# Patient Record
Sex: FEMALE | Race: WHITE | URBAN - METROPOLITAN AREA
[De-identification: names, ages, dates, MRNs, and addresses within clinical notes are randomized per-mention and may not be internally consistent; named-entity substitution may affect disease eponyms.]

---

## 2017-08-24 ENCOUNTER — EMERGENCY (EMERGENCY)
Facility: HOSPITAL | Age: 42
LOS: 0 days | Discharge: HOME | End: 2017-08-24
Admitting: GENERAL PRACTICE

## 2017-08-24 DIAGNOSIS — O20.8 OTHER HEMORRHAGE IN EARLY PREGNANCY: ICD-10-CM

## 2017-08-24 DIAGNOSIS — Z79.899 OTHER LONG TERM (CURRENT) DRUG THERAPY: ICD-10-CM

## 2017-08-24 DIAGNOSIS — N93.9 ABNORMAL UTERINE AND VAGINAL BLEEDING, UNSPECIFIED: ICD-10-CM

## 2017-08-24 DIAGNOSIS — K21.9 GASTRO-ESOPHAGEAL REFLUX DISEASE WITHOUT ESOPHAGITIS: ICD-10-CM

## 2017-08-24 DIAGNOSIS — Z88.0 ALLERGY STATUS TO PENICILLIN: ICD-10-CM

## 2017-08-26 ENCOUNTER — EMERGENCY (EMERGENCY)
Facility: HOSPITAL | Age: 42
LOS: 0 days | Discharge: HOME | End: 2017-08-26
Admitting: GENERAL PRACTICE

## 2017-08-26 DIAGNOSIS — R79.89 OTHER SPECIFIED ABNORMAL FINDINGS OF BLOOD CHEMISTRY: ICD-10-CM

## 2017-08-26 DIAGNOSIS — Z79.899 OTHER LONG TERM (CURRENT) DRUG THERAPY: ICD-10-CM

## 2017-08-26 DIAGNOSIS — Z88.0 ALLERGY STATUS TO PENICILLIN: ICD-10-CM

## 2017-08-26 DIAGNOSIS — O03.9 COMPLETE OR UNSPECIFIED SPONTANEOUS ABORTION WITHOUT COMPLICATION: ICD-10-CM

## 2017-08-26 DIAGNOSIS — K21.9 GASTRO-ESOPHAGEAL REFLUX DISEASE WITHOUT ESOPHAGITIS: ICD-10-CM

## 2017-09-06 ENCOUNTER — EMERGENCY (EMERGENCY)
Facility: HOSPITAL | Age: 42
LOS: 0 days | Discharge: HOME | End: 2017-09-06
Admitting: GENERAL PRACTICE

## 2017-09-06 DIAGNOSIS — Z88.0 ALLERGY STATUS TO PENICILLIN: ICD-10-CM

## 2017-09-06 DIAGNOSIS — Z79.899 OTHER LONG TERM (CURRENT) DRUG THERAPY: ICD-10-CM

## 2017-09-06 DIAGNOSIS — Z32.02 ENCOUNTER FOR PREGNANCY TEST, RESULT NEGATIVE: ICD-10-CM

## 2017-09-06 DIAGNOSIS — K21.9 GASTRO-ESOPHAGEAL REFLUX DISEASE WITHOUT ESOPHAGITIS: ICD-10-CM

## 2018-01-10 ENCOUNTER — OUTPATIENT (OUTPATIENT)
Dept: OUTPATIENT SERVICES | Facility: HOSPITAL | Age: 43
LOS: 1 days | Discharge: HOME | End: 2018-01-10

## 2018-01-29 ENCOUNTER — OUTPATIENT (OUTPATIENT)
Dept: OUTPATIENT SERVICES | Facility: HOSPITAL | Age: 43
LOS: 1 days | Discharge: HOME | End: 2018-01-29

## 2018-02-05 ENCOUNTER — OUTPATIENT (OUTPATIENT)
Dept: OUTPATIENT SERVICES | Facility: HOSPITAL | Age: 43
LOS: 1 days | Discharge: HOME | End: 2018-02-05

## 2018-03-05 PROBLEM — Z00.00 ENCOUNTER FOR PREVENTIVE HEALTH EXAMINATION: Status: ACTIVE | Noted: 2018-03-05

## 2018-03-27 ENCOUNTER — LABORATORY RESULT (OUTPATIENT)
Age: 43
End: 2018-03-27

## 2018-03-27 ENCOUNTER — OUTPATIENT (OUTPATIENT)
Dept: OUTPATIENT SERVICES | Facility: HOSPITAL | Age: 43
LOS: 1 days | Discharge: HOME | End: 2018-03-27

## 2018-03-27 ENCOUNTER — APPOINTMENT (OUTPATIENT)
Dept: UROLOGY | Facility: CLINIC | Age: 43
End: 2018-03-27
Payer: MEDICAID

## 2018-03-27 VITALS
HEIGHT: 60 IN | HEART RATE: 74 BPM | DIASTOLIC BLOOD PRESSURE: 65 MMHG | SYSTOLIC BLOOD PRESSURE: 103 MMHG | WEIGHT: 125 LBS | BODY MASS INDEX: 24.54 KG/M2

## 2018-03-27 DIAGNOSIS — Z78.9 OTHER SPECIFIED HEALTH STATUS: ICD-10-CM

## 2018-03-27 PROCEDURE — 99212 OFFICE O/P EST SF 10 MIN: CPT

## 2018-03-27 RX ORDER — CLOBETASOL PROPIONATE 0.5 MG/G
0.05 CREAM TOPICAL
Qty: 60 | Refills: 0 | Status: ACTIVE | COMMUNITY
Start: 2017-11-20

## 2018-03-27 RX ORDER — MICONAZOLE NITRATE 20 MG/G
2 CREAM VAGINAL
Qty: 45 | Refills: 0 | Status: ACTIVE | COMMUNITY
Start: 2017-11-20

## 2018-03-27 RX ORDER — CIPROFLOXACIN HYDROCHLORIDE 500 MG/1
500 TABLET, FILM COATED ORAL
Qty: 14 | Refills: 0 | Status: ACTIVE | COMMUNITY
Start: 2017-10-05

## 2018-03-27 RX ORDER — NITROFURANTOIN (MONOHYDRATE/MACROCRYSTALS) 25; 75 MG/1; MG/1
100 CAPSULE ORAL
Qty: 14 | Refills: 0 | Status: ACTIVE | COMMUNITY
Start: 2017-09-28

## 2018-03-27 RX ORDER — ACYCLOVIR 50 MG/G
5 CREAM TOPICAL
Qty: 15 | Refills: 0 | Status: ACTIVE | COMMUNITY
Start: 2017-11-20

## 2018-03-27 RX ORDER — CLOTRIMAZOLE AND BETAMETHASONE DIPROPIONATE 10; .5 MG/G; MG/G
1-0.05 CREAM TOPICAL
Qty: 45 | Refills: 0 | Status: ACTIVE | COMMUNITY
Start: 2017-11-24

## 2018-03-27 RX ORDER — PERMETHRIN 50 MG/G
5 CREAM TOPICAL
Qty: 60 | Refills: 0 | Status: ACTIVE | COMMUNITY
Start: 2017-12-14

## 2018-03-28 DIAGNOSIS — N39.0 URINARY TRACT INFECTION, SITE NOT SPECIFIED: ICD-10-CM

## 2018-03-30 ENCOUNTER — OUTPATIENT (OUTPATIENT)
Dept: OUTPATIENT SERVICES | Facility: HOSPITAL | Age: 43
LOS: 1 days | Discharge: HOME | End: 2018-03-30

## 2018-03-30 DIAGNOSIS — N20.0 CALCULUS OF KIDNEY: ICD-10-CM

## 2018-03-30 DIAGNOSIS — R10.9 UNSPECIFIED ABDOMINAL PAIN: ICD-10-CM

## 2018-03-30 DIAGNOSIS — N39.0 URINARY TRACT INFECTION, SITE NOT SPECIFIED: ICD-10-CM

## 2018-04-27 ENCOUNTER — APPOINTMENT (OUTPATIENT)
Dept: UROLOGY | Facility: CLINIC | Age: 43
End: 2018-04-27
Payer: MEDICAID

## 2018-04-27 VITALS
WEIGHT: 125 LBS | HEIGHT: 60 IN | DIASTOLIC BLOOD PRESSURE: 74 MMHG | BODY MASS INDEX: 24.54 KG/M2 | HEART RATE: 72 BPM | SYSTOLIC BLOOD PRESSURE: 116 MMHG

## 2018-04-27 DIAGNOSIS — A49.9 URINARY TRACT INFECTION, SITE NOT SPECIFIED: ICD-10-CM

## 2018-04-27 DIAGNOSIS — N39.0 URINARY TRACT INFECTION, SITE NOT SPECIFIED: ICD-10-CM

## 2018-04-27 DIAGNOSIS — N23 UNSPECIFIED RENAL COLIC: ICD-10-CM

## 2018-04-27 LAB
APPEARANCE: CLEAR
BILIRUBIN URINE: NEGATIVE
BLOOD URINE: ABNORMAL
COLOR: YELLOW
GLUCOSE QUALITATIVE U: NEGATIVE MG/DL
KETONES URINE: NEGATIVE
LEUKOCYTE ESTERASE URINE: NEGATIVE
NITRITE URINE: NEGATIVE
PH URINE: 6
PROTEIN URINE: NEGATIVE MG/DL
SPECIFIC GRAVITY URINE: 1.01
UROBILINOGEN URINE: NEGATIVE MG/DL

## 2018-04-27 PROCEDURE — 99213 OFFICE O/P EST LOW 20 MIN: CPT

## 2018-04-27 RX ORDER — SULFAMETHOXAZOLE AND TRIMETHOPRIM 400; 80 MG/1; MG/1
400-80 TABLET ORAL DAILY
Qty: 60 | Refills: 3 | Status: ACTIVE | COMMUNITY
Start: 2018-04-27 | End: 1900-01-01

## 2018-08-14 ENCOUNTER — EMERGENCY (EMERGENCY)
Facility: HOSPITAL | Age: 43
LOS: 1 days | Discharge: HOME | End: 2018-08-14

## 2018-08-14 DIAGNOSIS — Z02.9 ENCOUNTER FOR ADMINISTRATIVE EXAMINATIONS, UNSPECIFIED: ICD-10-CM

## 2018-10-15 ENCOUNTER — TRANSCRIPTION ENCOUNTER (OUTPATIENT)
Age: 43
End: 2018-10-15

## 2018-10-16 ENCOUNTER — TRANSCRIPTION ENCOUNTER (OUTPATIENT)
Age: 43
End: 2018-10-16

## 2018-10-30 ENCOUNTER — APPOINTMENT (OUTPATIENT)
Dept: UROLOGY | Facility: CLINIC | Age: 43
End: 2018-10-30

## 2018-11-02 ENCOUNTER — OUTPATIENT (OUTPATIENT)
Dept: OUTPATIENT SERVICES | Facility: HOSPITAL | Age: 43
LOS: 1 days | Discharge: HOME | End: 2018-11-02

## 2018-11-02 DIAGNOSIS — Z12.31 ENCOUNTER FOR SCREENING MAMMOGRAM FOR MALIGNANT NEOPLASM OF BREAST: ICD-10-CM

## 2019-02-07 ENCOUNTER — OUTPATIENT (OUTPATIENT)
Dept: OUTPATIENT SERVICES | Facility: HOSPITAL | Age: 44
LOS: 1 days | Discharge: HOME | End: 2019-02-07

## 2019-03-01 ENCOUNTER — OUTPATIENT (OUTPATIENT)
Dept: OUTPATIENT SERVICES | Facility: HOSPITAL | Age: 44
LOS: 1 days | End: 2019-03-01
Payer: MEDICAID

## 2019-03-01 PROCEDURE — G9001: CPT

## 2019-03-22 DIAGNOSIS — Z71.89 OTHER SPECIFIED COUNSELING: ICD-10-CM

## 2019-03-27 ENCOUNTER — APPOINTMENT (OUTPATIENT)
Dept: HEMATOLOGY ONCOLOGY | Facility: CLINIC | Age: 44
End: 2019-03-27

## 2019-08-04 ENCOUNTER — TRANSCRIPTION ENCOUNTER (OUTPATIENT)
Age: 44
End: 2019-08-04

## 2019-08-06 ENCOUNTER — TRANSCRIPTION ENCOUNTER (OUTPATIENT)
Age: 44
End: 2019-08-06

## 2019-08-07 ENCOUNTER — INPATIENT (INPATIENT)
Facility: HOSPITAL | Age: 44
LOS: 3 days | Discharge: HOME HEALTH | End: 2019-08-11
Attending: HOSPITALIST | Admitting: HOSPITALIST
Payer: MEDICAID

## 2019-08-07 ENCOUNTER — TRANSCRIPTION ENCOUNTER (OUTPATIENT)
Age: 44
End: 2019-08-07

## 2019-08-07 VITALS
HEART RATE: 81 BPM | SYSTOLIC BLOOD PRESSURE: 111 MMHG | DIASTOLIC BLOOD PRESSURE: 57 MMHG | RESPIRATION RATE: 16 BRPM | OXYGEN SATURATION: 99 % | TEMPERATURE: 97 F

## 2019-08-07 LAB
ALBUMIN SERPL ELPH-MCNC: 3.9 G/DL — SIGNIFICANT CHANGE UP (ref 3.5–5.2)
ALP SERPL-CCNC: 77 U/L — SIGNIFICANT CHANGE UP (ref 30–115)
ALT FLD-CCNC: 36 U/L — SIGNIFICANT CHANGE UP (ref 0–41)
ANION GAP SERPL CALC-SCNC: 11 MMOL/L — SIGNIFICANT CHANGE UP (ref 7–14)
AST SERPL-CCNC: 29 U/L — SIGNIFICANT CHANGE UP (ref 0–41)
BASOPHILS # BLD AUTO: 0.04 K/UL — SIGNIFICANT CHANGE UP (ref 0–0.2)
BASOPHILS NFR BLD AUTO: 0.3 % — SIGNIFICANT CHANGE UP (ref 0–1)
BILIRUB SERPL-MCNC: 0.3 MG/DL — SIGNIFICANT CHANGE UP (ref 0.2–1.2)
BUN SERPL-MCNC: 8 MG/DL — LOW (ref 10–20)
CALCIUM SERPL-MCNC: 8.9 MG/DL — SIGNIFICANT CHANGE UP (ref 8.5–10.1)
CHLORIDE SERPL-SCNC: 105 MMOL/L — SIGNIFICANT CHANGE UP (ref 98–110)
CO2 SERPL-SCNC: 22 MMOL/L — SIGNIFICANT CHANGE UP (ref 17–32)
CREAT SERPL-MCNC: 0.7 MG/DL — SIGNIFICANT CHANGE UP (ref 0.7–1.5)
EOSINOPHIL # BLD AUTO: 0.34 K/UL — SIGNIFICANT CHANGE UP (ref 0–0.7)
EOSINOPHIL NFR BLD AUTO: 2.5 % — SIGNIFICANT CHANGE UP (ref 0–8)
GLUCOSE SERPL-MCNC: 100 MG/DL — HIGH (ref 70–99)
HCT VFR BLD CALC: 32.6 % — LOW (ref 37–47)
HGB BLD-MCNC: 10.4 G/DL — LOW (ref 12–16)
IMM GRANULOCYTES NFR BLD AUTO: 0.6 % — HIGH (ref 0.1–0.3)
LACTATE SERPL-SCNC: 1 MMOL/L — SIGNIFICANT CHANGE UP (ref 0.5–2.2)
LYMPHOCYTES # BLD AUTO: 1.08 K/UL — LOW (ref 1.2–3.4)
LYMPHOCYTES # BLD AUTO: 8 % — LOW (ref 20.5–51.1)
MCHC RBC-ENTMCNC: 28.3 PG — SIGNIFICANT CHANGE UP (ref 27–31)
MCHC RBC-ENTMCNC: 31.9 G/DL — LOW (ref 32–37)
MCV RBC AUTO: 88.6 FL — SIGNIFICANT CHANGE UP (ref 81–99)
MONOCYTES # BLD AUTO: 0.78 K/UL — HIGH (ref 0.1–0.6)
MONOCYTES NFR BLD AUTO: 5.8 % — SIGNIFICANT CHANGE UP (ref 1.7–9.3)
NEUTROPHILS # BLD AUTO: 11.1 K/UL — HIGH (ref 1.4–6.5)
NEUTROPHILS NFR BLD AUTO: 82.8 % — HIGH (ref 42.2–75.2)
NRBC # BLD: 0 /100 WBCS — SIGNIFICANT CHANGE UP (ref 0–0)
PLATELET # BLD AUTO: 342 K/UL — SIGNIFICANT CHANGE UP (ref 130–400)
POTASSIUM SERPL-MCNC: 3.9 MMOL/L — SIGNIFICANT CHANGE UP (ref 3.5–5)
POTASSIUM SERPL-SCNC: 3.9 MMOL/L — SIGNIFICANT CHANGE UP (ref 3.5–5)
PROT SERPL-MCNC: 6.8 G/DL — SIGNIFICANT CHANGE UP (ref 6–8)
RBC # BLD: 3.68 M/UL — LOW (ref 4.2–5.4)
RBC # FLD: 13.2 % — SIGNIFICANT CHANGE UP (ref 11.5–14.5)
SODIUM SERPL-SCNC: 138 MMOL/L — SIGNIFICANT CHANGE UP (ref 135–146)
WBC # BLD: 13.42 K/UL — HIGH (ref 4.8–10.8)
WBC # FLD AUTO: 13.42 K/UL — HIGH (ref 4.8–10.8)

## 2019-08-07 PROCEDURE — 99285 EMERGENCY DEPT VISIT HI MDM: CPT

## 2019-08-07 RX ORDER — SODIUM CHLORIDE 9 MG/ML
1000 INJECTION INTRAMUSCULAR; INTRAVENOUS; SUBCUTANEOUS ONCE
Refills: 0 | Status: COMPLETED | OUTPATIENT
Start: 2019-08-07 | End: 2019-08-07

## 2019-08-07 RX ORDER — VANCOMYCIN HCL 1 G
1000 VIAL (EA) INTRAVENOUS ONCE
Refills: 0 | Status: COMPLETED | OUTPATIENT
Start: 2019-08-07 | End: 2019-08-07

## 2019-08-07 RX ORDER — KETOROLAC TROMETHAMINE 30 MG/ML
15 SYRINGE (ML) INJECTION ONCE
Refills: 0 | Status: DISCONTINUED | OUTPATIENT
Start: 2019-08-07 | End: 2019-08-07

## 2019-08-07 RX ORDER — ENOXAPARIN SODIUM 100 MG/ML
40 INJECTION SUBCUTANEOUS DAILY
Refills: 0 | Status: DISCONTINUED | OUTPATIENT
Start: 2019-08-07 | End: 2019-08-09

## 2019-08-07 RX ORDER — VANCOMYCIN HCL 1 G
1000 VIAL (EA) INTRAVENOUS EVERY 12 HOURS
Refills: 0 | Status: DISCONTINUED | OUTPATIENT
Start: 2019-08-07 | End: 2019-08-09

## 2019-08-07 RX ORDER — SODIUM CHLORIDE 9 MG/ML
1000 INJECTION, SOLUTION INTRAVENOUS ONCE
Refills: 0 | Status: DISCONTINUED | OUTPATIENT
Start: 2019-08-07 | End: 2019-08-07

## 2019-08-07 RX ORDER — PANTOPRAZOLE SODIUM 20 MG/1
40 TABLET, DELAYED RELEASE ORAL
Refills: 0 | Status: DISCONTINUED | OUTPATIENT
Start: 2019-08-07 | End: 2019-08-09

## 2019-08-07 RX ORDER — ACETAMINOPHEN 500 MG
975 TABLET ORAL ONCE
Refills: 0 | Status: COMPLETED | OUTPATIENT
Start: 2019-08-07 | End: 2019-08-07

## 2019-08-07 RX ADMIN — Medication 250 MILLIGRAM(S): at 17:20

## 2019-08-07 RX ADMIN — SODIUM CHLORIDE 1000 MILLILITER(S): 9 INJECTION INTRAMUSCULAR; INTRAVENOUS; SUBCUTANEOUS at 17:19

## 2019-08-07 RX ADMIN — Medication 15 MILLIGRAM(S): at 18:59

## 2019-08-07 NOTE — ED PROVIDER NOTE - NS HIV RISK FACTOR YES
[FreeTextEntry1] : In summary, this is a 72-year-old postmenopausal  lady with stage IA invasive ductal carcinoma of the left breast. T1a, N0, M0. ER positive, [place] positive, HER-2/nimco negative. She is status post lumpectomy and sentinel axillary lymph node excision.  Patient has a good performance status and is generally very healthy. She completed RT and started Letrozole July 2016.\par \par - Breast ca: LIZETTE. Tolerating Letrozole very well without significant side effects. Reports good compliance. Continue AI for total of 5-10 years. Breast imaging per Dr Reddy.\par - Osteopenia: concern for worsening bone density due to anastrozole. Rec to  continue calcium and vit D. DEXA 1-2 yrs. \par - High cholesterol: concern for worsening cholesterol due to anastrozole. Pt is on zocor. Lipid profile annually.\par - Continue to followup with primary care and Gyn\par \par RTC 6 m. 
Declined

## 2019-08-07 NOTE — H&P ADULT - ASSESSMENT
44 yo F with PMH of anxiety, GERD presents to the ED c/o abscess to right axilla that has been getting progressively larger in size and more painful x 1 week.    Right axillary cellulitis  -Already treated with Bactrim and clindamycin oral  -s/p 2 IND  -Redness and tenderness 4*4cm area in the right axilla persists.  -Will treat with IV Vancomycin.  -General Surgery consult for possible drainage though no fluctuation.(Resisting pressure)  -Would like General Anaesthesia if I and D required.    GERD  -Pantoprazole    Anxiety  -According to patient is stable and not on any medications.    Diet - Regular  DVT - Lovenox  GI - PPI  FULL CODE 44 yo F with PMH of anxiety, GERD presents to the ED c/o abscess to right axilla that has been getting progressively larger in size and more painful x 1 week.    Right axillary cellulitis  -Already treated with Bactrim and clindamycin oral  -s/p 2 IND  -Redness and tenderness 4*4cm area in the right axilla persists.  -Will treat with IV Vancomycin.  -General Surgery consult for possible drainage though no fluctuation.(Resisting pressure)    GERD  -Pantoprazole    Anxiety  -According to patient is stable and not on any medications.    Diet - Regular  DVT - Lovenox  GI - PPI  FULL CODE    #Progress Note Handoff  Pending (specify): possible I&D  Family discussion: N/A d/w the patient .   Disposition: Home

## 2019-08-07 NOTE — ED ADULT NURSE NOTE - NSIMPLEMENTINTERV_GEN_ALL_ED
Implemented All Universal Safety Interventions:  [place] to call system. Call bell, personal items and telephone within reach. Instruct patient to call for assistance. Room bathroom lighting operational. Non-slip footwear when patient is off stretcher. Physically safe environment: no spills, clutter or unnecessary equipment. Stretcher in lowest position, wheels locked, appropriate side rails in place.

## 2019-08-07 NOTE — ED PROVIDER NOTE - OBJECTIVE STATEMENT
42 yo female with PMH of anxiety, GERD presents to the ED c/o abscess to right axilla that has been getting progressively larger in size and more painful x 1 week.  Patient seen at  on 8/4 and had I&D with minimal drainage and started on clinda. 44 yo female with PMH of anxiety, GERD presents to the ED c/o abscess to right axilla that has been getting progressively larger in size and more painful x 1 week.  Patient seen at  on 8/4 and had I&D with minimal drainage and started on clinda. Patient seen again on 8/6 and had another I&D performed with more purulent drainage this time and was given Bactrim as well.  Patient denies fever, chills, chest pain, SOB, N/V/D, cough, or rash.

## 2019-08-07 NOTE — ED PROVIDER NOTE - CLINICAL SUMMARY MEDICAL DECISION MAKING FREE TEXT BOX
pt with axillary abscess, offered extending her i&d, adamantly refused, demanding iv abx and general anesthesia.  pt admitted to [facility] for iv abx and routine surg consult

## 2019-08-07 NOTE — H&P ADULT - NSHPPHYSICALEXAM_GEN_ALL_CORE
Constitutional: well-nourished, appears stated age  Eyes: PERRLA, and symmetrical lids.  ENT: Neck supple with no adenopathy, moist MM.  Cardiovascular: regular rate, regular rhythm, well-perfused extremities  Respiratory: labored respiratory effort with frequent cough, B/L diffuse significant wheezing, gets SOB if speaks longer sentences  Gastrointestinal: soft, non-tender abdomen, no pulsatile mass  Musculoskeletal: supple neck, no lower extremity edema  Integumentary: Right axillary 4*4 cm area of redness, tender, no fluctuation (Resisting any pressure)  Neurologic: awake, alert, extremities’ motor and sensory functions grossly intact

## 2019-08-07 NOTE — H&P ADULT - HISTORY OF PRESENT ILLNESS
44 yo F with PMH of anxiety, GERD presents to the ED c/o abscess to right axilla that has been getting progressively larger in size and more painful x 1 week.  Patient seen at  on 8/4 and had I&D with minimal drainage and started on clindamycin.  The size did not get smaller and according to the patient she has a history of MRSA infection( Does not remember where and when ). She went to the ED 2 days later and mentioned about MRSA, so antibiotics were changed to [id number]. It did not help and redness and swelling persisted and she presented to the ED.    In the ED, she received one dose of IV vancomycin. On my examination she is resting comfortably, and mentions that if at all drainage and I and D is required she would like to go under general anesthesia and would not like local anesthesia

## 2019-08-07 NOTE — ED PROVIDER NOTE - PROGRESS NOTE DETAILS
Patient refusing I &D of abscess to right axilla.  Patient had two I&D performed previously at an  and requesting IV antibiotics stating that PO abx have not been helping.

## 2019-08-07 NOTE — ED PROVIDER NOTE - PHYSICAL EXAMINATION
GENERAL: Well-nourished, Well-developed. NAD.  ENMT: MMM  Neck: FROM  CVS:  Normal S1,S2. No murmurs appreciated on auscultation   RESP: No use of accessory muscles. Chest rise symmetrical with good expansion. Lungs clear to auscultation B/L. No wheezing, rales, or rhonchi auscultated.  MSK: No visible signs of trauma such as ecchymosis, erythema, or swelling. FROM of upper and lower extremities B/L.   Skin: + abscess, indurated, small area of fluctuance that is TTP and located to right axilla with mild surrounding erythema.  + small opening from previous I&D with mild purulent drainage draining from wound when palpated. No packing (patient states that she removed the packing)  EXT: Radial pulses present B/L.   Neuro: AA&O x 3. CNs II-XII grossly intact. Speaking in full sentences. No slurring of speech. No facial droop. No tremors. Sensation grossly intact. Strength 5/5 B/L. Gait within normal limits.   Psych:  Cooperative. Anxious.

## 2019-08-07 NOTE — ED ADULT TRIAGE NOTE - CHIEF COMPLAINT QUOTE
"I have a boil (under right arm) that I went to urgent care twice for. The boil is getting worse" Pt has been on PO abx with no improvement

## 2019-08-07 NOTE — ED PROVIDER NOTE - ATTENDING CONTRIBUTION TO CARE
42 yo f with pmh of gerd and anxiety, presents with persistent right axillary abscess. pt says started few days ago, went to an urg care and had abscess i&d'd and started on abx.  pt says no improvement so returned to the urg care center and they repeated the i&d and packed the abscess.  pt admits that she couldn't tolerate the procedure well.  today accidentally pulled out abscess so came to ed.  pt states the abscess is enlarging and more red.  no fever, no chills. exam: nad, ncat, perrl, eomi, mmm, rrr, ctab, abd soft, nt,nd aox3, right axillary abscess along anterior axillary line, ttp, erythematous imp: pt with axillary abscess, offered extending her i&d, adamantly refused, demanding iv abx and general anesthesia.

## 2019-08-07 NOTE — ED PROVIDER NOTE - NS ED ROS FT
Constitutional: (-) fever (-) chills  Respiratory: (-) cough (-) SOB   GI: (-) nausea (-) vomiting (-) diarrhea   Neuro: (-) headache (-) dizziness (-) numbness/tingling to extremities B/L   Skin: (+) abscess to right axilla (-) rash   Except as documented in the HPI, all other systems are negative.

## 2019-08-08 LAB
BLD GP AB SCN SERPL QL: SIGNIFICANT CHANGE UP
HCG UR QL: NEGATIVE — SIGNIFICANT CHANGE UP

## 2019-08-08 PROCEDURE — 71045 X-RAY EXAM CHEST 1 VIEW: CPT | Mod: 26

## 2019-08-08 PROCEDURE — 99223 1ST HOSP IP/OBS HIGH 75: CPT

## 2019-08-08 PROCEDURE — 99223 1ST HOSP IP/OBS HIGH 75: CPT | Mod: AI

## 2019-08-08 RX ORDER — ALPRAZOLAM 0.25 MG
0.25 TABLET ORAL AT BEDTIME
Refills: 0 | Status: DISCONTINUED | OUTPATIENT
Start: 2019-08-08 | End: 2019-08-08

## 2019-08-08 RX ORDER — CHLORHEXIDINE GLUCONATE 213 G/1000ML
1 SOLUTION TOPICAL
Refills: 0 | Status: DISCONTINUED | OUTPATIENT
Start: 2019-08-08 | End: 2019-08-09

## 2019-08-08 RX ORDER — KETOROLAC TROMETHAMINE 30 MG/ML
30 SYRINGE (ML) INJECTION EVERY 6 HOURS
Refills: 0 | Status: DISCONTINUED | OUTPATIENT
Start: 2019-08-08 | End: 2019-08-09

## 2019-08-08 RX ORDER — ACETAMINOPHEN 500 MG
650 TABLET ORAL EVERY 6 HOURS
Refills: 0 | Status: DISCONTINUED | OUTPATIENT
Start: 2019-08-08 | End: 2019-08-09

## 2019-08-08 RX ORDER — SODIUM CHLORIDE 9 MG/ML
1000 INJECTION INTRAMUSCULAR; INTRAVENOUS; SUBCUTANEOUS
Refills: 0 | Status: DISCONTINUED | OUTPATIENT
Start: 2019-08-08 | End: 2019-08-09

## 2019-08-08 RX ORDER — KETOROLAC TROMETHAMINE 30 MG/ML
15 SYRINGE (ML) INJECTION ONCE
Refills: 0 | Status: DISCONTINUED | OUTPATIENT
Start: 2019-08-08 | End: 2019-08-08

## 2019-08-08 RX ORDER — LANOLIN ALCOHOL/MO/W.PET/CERES
5 CREAM (GRAM) TOPICAL AT BEDTIME
Refills: 0 | Status: DISCONTINUED | OUTPATIENT
Start: 2019-08-08 | End: 2019-08-09

## 2019-08-08 RX ADMIN — Medication 5 MILLIGRAM(S): at 21:18

## 2019-08-08 RX ADMIN — Medication 15 MILLIGRAM(S): at 07:28

## 2019-08-08 RX ADMIN — Medication 15 MILLIGRAM(S): at 06:58

## 2019-08-08 RX ADMIN — Medication 250 MILLIGRAM(S): at 06:23

## 2019-08-08 RX ADMIN — Medication 250 MILLIGRAM(S): at 18:11

## 2019-08-08 RX ADMIN — Medication 0.25 MILLIGRAM(S): at 21:18

## 2019-08-08 NOTE — CONSULT NOTE ADULT - SUBJECTIVE AND OBJECTIVE BOX
MARLON SANDOVAL [id number]  44 yo F with PMH of anxiety, GERD presents to the ED c/o abscess to right axilla that has been getting progressively larger in size and more painful x 2 weeks.  Patient seen at  on and had I&D with minimal drainage (bloody as per patient) and started on clindamycin. Due to lack of improvement She returned to urgent care shortly after and abscess was re I&Dd with purulent drainage and packed. Today patient removed packing accidently noticing purulent drainage and came to ED for evaluation    In the ED, she received one dose of IV vancomycin. Surgical consult placed for evaluation and I&D - extensive conversation had with patient however she is currently refusing bedside I&D saying she cannot tolerate it and requires general anesthesia       PAST MEDICAL & SURGICAL HISTORY:  Anxiety  GERD (gastroesophageal reflux disease)        MEDICATIONS  (STANDING):  enoxaparin Injectable 40 milliGRAM(s) SubCutaneous daily  pantoprazole    Tablet 40 milliGRAM(s) Oral before breakfast  vancomycin  IVPB 1000 milliGRAM(s) IV Intermittent every 12 hours    MEDICATIONS  (PRN):      Allergies    penicillins (Unknown)    Intolerances        REVIEW OF SYSTEMS    [X] A ten-point review of systems was otherwise negative except as noted.  [ ] Due to altered mental status/intubation, subjective information were not able to be obtained from the patient. History was obtained, to the extent possible, from review of the chart and collateral sources of information.      Vital Signs Last 24 Hrs  T(C): 36.9 (07 Aug 2019 23:13), Max: 36.9 (07 Aug 2019 23:13)  T(F): 98.5 (07 Aug 2019 23:13), Max: 98.5 (07 Aug 2019 23:13)  HR: 72 (07 Aug 2019 23:13) (72 - 86)  BP: 109/66 (07 Aug 2019 23:13) (105/56 - 111/57)  BP(mean): --  RR: 18 (07 Aug 2019 23:13) (16 - 18)  SpO2: 100% (07 Aug 2019 21:18) (99% - 100%)    PHYSICAL EXAM:  GENERAL: NAD, well-appearing  CHEST/LUNG: Clear to auscultation bilaterally  HEART: Regular rate and rhythm  ABDOMEN: Soft, Nontender, Nondistended;   EXTREMITIES:  No clubbing, cyanosis, or edema, right axillary abscess with minimal purulent drainage expressed - small incision ( ~0.5cm)      LABS:  Labs:  CAPILLARY BLOOD GLUCOSE                              10.4   13.42 )-----------( 342      ( 07 Aug 2019 17:12 )             32.6       Auto Neutrophil %: 82.8 % (08-07-19 @ 17:12)  Auto Immature Granulocyte %: 0.6 % (08-07-19 @ 17:12)    08-07    138  |  105  |  8<L>  ----------------------------<  100<H>  3.9   |  22  |  0.7      Calcium, Total Serum: 8.9 mg/dL (08-07-19 @ 17:12)      LFTs:             6.8  | 0.3  | 29       ------------------[77      ( 07 Aug 2019 17:12 )  3.9  | x    | 36          Lipase:x      Amylase:x         Lactate, Blood: 1.0 mmol/L (08-07-19 @ 17:12)      RADIOLOGY & ADDITIONAL STUDIES:

## 2019-08-08 NOTE — CONSULT NOTE ADULT - ASSESSMENT
43f with right axillary abscess    Plan:  Offered patient I&D multiple times throughout the evening - explained risks of not proceeding with extension of the incision however at this time patient is refusing any bedside procedure and demanding OR for general anesthesia    Plan discussed with Dr. vargas

## 2019-08-08 NOTE — PROGRESS NOTE ADULT - SUBJECTIVE AND OBJECTIVE BOX
GENERAL SURGERY PROGRESS NOTE     MARLON SANDOVAL  43y  Female  Hospital day :1d  POD:  Procedure:     OVERNIGHT EVENTS:  Refused bedside debridement overnight.    T(F): 98.5 (08-07-19 @ 23:13), Max: 98.5 (08-07-19 @ 23:13)  HR: 72 (08-07-19 @ 23:13) (72 - 86)  BP: 109/66 (08-07-19 @ 23:13) (105/56 - 111/57)  ABP: --  ABP(mean): --  RR: 18 (08-07-19 @ 23:13) (16 - 18)  SpO2: 100% (08-07-19 @ 21:18) (99% - 100%)    GI proph:  pantoprazole    Tablet 40 milliGRAM(s) Oral before breakfast    AC/ proph:   ABx: vancomycin  IVPB 1000 milliGRAM(s) IV Intermittent every 12 hours      PHYSICAL EXAM:  GENERAL: NAD, well-appearing  CHEST/LUNG: Clear to auscultation bilaterally  HEART: Regular rate and rhythm  ABDOMEN: Soft, Nontender, Nondistended;   EXTREMITIES:  No clubbing, cyanosis, or edema      LABS  Labs:  CAPILLARY BLOOD GLUCOSE                              10.4   13.42 )-----------( 342      ( 07 Aug 2019 17:12 )             32.6       Auto Neutrophil %: 82.8 % (08-07-19 @ 17:12)  Auto Immature Granulocyte %: 0.6 % (08-07-19 @ 17:12)    08-07    138  |  105  |  8<L>  ----------------------------<  100<H>  3.9   |  22  |  0.7      Calcium, Total Serum: 8.9 mg/dL (08-07-19 @ 17:12)      LFTs:             6.8  | 0.3  | 29       ------------------[77      ( 07 Aug 2019 17:12 )  3.9  | x    | 36          Lipase:x      Amylase:x         Lactate, Blood: 1.0 mmol/L (08-07-19 @ 17:12)

## 2019-08-08 NOTE — CONSULT NOTE ADULT - CONSULT REQUESTED BY NAME
Post-Anesthesia Evaluation and Assessment    Patient: Bonnie Rodriguez MRN: [id number]  SSN: [id number]    YOB: 1978  Age: 42 y.o. Sex: female       Cardiovascular Function/Vital Signs  Visit Vitals    /67    Pulse 76    Temp 36.8 °C (98.2 °F)    Resp 14    Ht 5' 3\" (1.6 m)    Wt 77.1 kg (170 lb)    SpO2 95%    BMI 30.11 kg/m2       Patient is status post general anesthesia for Procedure(s):  TOTAL ABDOMINAL  HYSTERECTOMY  (BALBINA) with BILATERAL SALPINGECTOMY. Nausea/Vomiting: None    Postoperative hydration reviewed and adequate. Pain:  Pain Scale 1: Numeric (0 - 10) (05/08/18 1908)  Pain Intensity 1: 6 (05/08/18 1908)   Managed    Neurological Status:   Neuro  Neurologic State: Drowsy; Eyes open spontaneously (05/08/18 1901)  Orientation Level: Oriented X4 (05/08/18 1901)  Cognition: Follows commands (05/08/18 1901)  Speech: Clear (05/08/18 1901)  LUE Motor Response: Purposeful (05/08/18 1901)  LLE Motor Response: Purposeful (05/08/18 1901)  RUE Motor Response: Purposeful (05/08/18 1901)  RLE Motor Response: Purposeful (05/08/18 1901)   At baseline    Mental Status and Level of Consciousness: Arousable    Pulmonary Status:   O2 Device: Room air (05/08/18 1901)   Adequate oxygenation and airway patent    Complications related to anesthesia: None    Post-anesthesia assessment completed.  No concerns    Signed By: Jana Black MD     May 8, 2018 ED

## 2019-08-08 NOTE — PROGRESS NOTE ADULT - SUBJECTIVE AND OBJECTIVE BOX
SUBJECTIVE:    Patient is a 43y old Female who presents with a chief complaint of Right axillary abscess (08 Aug 2019 00:02)     Currently admitted to medicine with the primary diagnosis of Failure of outpatient treatment     Today is hospital day 1d. There were no acute events overnight. Patient is sitting in bed complaining of pain in her R axilla which is extending medially into rib cage/breast area and laterally down her arm. She denies any fever, chills, SOB, palpitations, abdominal pain. She continues to refuse bedside I and D and also refused her lab draw this morning.     PAST MEDICAL & SURGICAL HISTORY  Anxiety  GERD (gastroesophageal reflux disease)    SOCIAL HISTORY:  Negative for smoking/alcohol/drug use.     ALLERGIES:  penicillins (Unknown)    MEDICATIONS:  STANDING MEDICATIONS  chlorhexidine 4% Liquid 1 Application(s) Topical <User Schedule>  enoxaparin Injectable 40 milliGRAM(s) SubCutaneous daily  melatonin 5 milliGRAM(s) Oral at bedtime  pantoprazole    Tablet 40 milliGRAM(s) Oral before breakfast  vancomycin  IVPB 1000 milliGRAM(s) IV Intermittent every 12 hours    PRN MEDICATIONS    VITALS:   T(F): 98.5  HR: 72 (72 - 86)  BP: 109/66 (105/56 - 111/57)  RR: 18 (16 - 18)  SpO2: 100% (99% - 100%)    LABS:                        10.4   13.42 )-----------( 342      ( 07 Aug 2019 17:12 )             32.6     08-07    138  |  105  |  8<L>  ----------------------------<  100<H>  3.9   |  22  |  0.7    Ca    8.9      07 Aug 2019 17:12    TPro  6.8  /  Alb  3.9  /  TBili  0.3  /  DBili  x   /  AST  29  /  ALT  36  /  AlkPhos  77  08-07        Lactate, Blood: 1.0 mmol/L (08-07-19 @ 17:12)        Lactate, Blood: 1.0 mmol/L (08-07-19 @ 17:12)      PHYSICAL EXAM:  GEN: In no acute distress. Pt. is awake in bed able to have a conversation.  LUNGS: CTABL, Symmetrical inspiration, no increaed work of breathing  HEART: +S1,S2, RRR, No murmurs, Rubs, Gallops   ABD: Bowel Sounds Present, Soft, non tender, non distended, no guarding, no rebound.   EXT: BLLE no edema or calf tenderness noted BL. right axillary abcess with small incision present; about 4x4 cm, erythematous extremely tender to palpations, not actively draining; tender to palpation on medial aspect of upper arm as well as axillary ribcage   NEURO: AAOX3. No focal deficits. CN 2-12 Grossly intact.

## 2019-08-08 NOTE — PROGRESS NOTE ADULT - ASSESSMENT
42 yo F with PMH of anxiety, GERD presents to the ED c/o abscess to right axilla that has been getting progressively larger in size and more painful x 1 week.    Right axillary cellulitis  -Already treated with Bactrim and clindamycin oral  -s/p 2 IND  -Redness and tenderness 4*4cm area in the right axilla persists; tenderness extending beyond confines of abcess  -Will treat with IV Vancomycin.  -General Surgery consult for possible drainage; pt refused bedside I and D  -Would like General Anaesthesia if I and D required.  - Remains Afebrile; denies chills  - WBC 13.4 on admission; refused morning labs  - 1 set of blood cultures pending - FU    GERD  -Pantoprazole    Anxiety  -According to patient is stable and not on any medications.    Diet - Regular  DVT - Lovenox  GI - PPI  FULL CODE

## 2019-08-09 LAB
ANION GAP SERPL CALC-SCNC: 10 MMOL/L — SIGNIFICANT CHANGE UP (ref 7–14)
APTT BLD: 31.9 SEC — SIGNIFICANT CHANGE UP (ref 27–39.2)
BUN SERPL-MCNC: 5 MG/DL — LOW (ref 10–20)
CALCIUM SERPL-MCNC: 8.5 MG/DL — SIGNIFICANT CHANGE UP (ref 8.5–10.1)
CHLORIDE SERPL-SCNC: 104 MMOL/L — SIGNIFICANT CHANGE UP (ref 98–110)
CO2 SERPL-SCNC: 25 MMOL/L — SIGNIFICANT CHANGE UP (ref 17–32)
CREAT SERPL-MCNC: 0.6 MG/DL — LOW (ref 0.7–1.5)
GLUCOSE SERPL-MCNC: 90 MG/DL — SIGNIFICANT CHANGE UP (ref 70–99)
HCT VFR BLD CALC: 27.7 % — LOW (ref 37–47)
HGB BLD-MCNC: 8.8 G/DL — LOW (ref 12–16)
INR BLD: 1.11 RATIO — SIGNIFICANT CHANGE UP (ref 0.65–1.3)
MAGNESIUM SERPL-MCNC: 1.9 MG/DL — SIGNIFICANT CHANGE UP (ref 1.8–2.4)
MCHC RBC-ENTMCNC: 28.1 PG — SIGNIFICANT CHANGE UP (ref 27–31)
MCHC RBC-ENTMCNC: 31.8 G/DL — LOW (ref 32–37)
MCV RBC AUTO: 88.5 FL — SIGNIFICANT CHANGE UP (ref 81–99)
NRBC # BLD: 0 /100 WBCS — SIGNIFICANT CHANGE UP (ref 0–0)
PHOSPHATE SERPL-MCNC: 3.8 MG/DL — SIGNIFICANT CHANGE UP (ref 2.1–4.9)
PLATELET # BLD AUTO: 314 K/UL — SIGNIFICANT CHANGE UP (ref 130–400)
POTASSIUM SERPL-MCNC: 4.2 MMOL/L — SIGNIFICANT CHANGE UP (ref 3.5–5)
POTASSIUM SERPL-SCNC: 4.2 MMOL/L — SIGNIFICANT CHANGE UP (ref 3.5–5)
PROTHROM AB SERPL-ACNC: 12.7 SEC — SIGNIFICANT CHANGE UP (ref 9.95–12.87)
RBC # BLD: 3.13 M/UL — LOW (ref 4.2–5.4)
RBC # FLD: 13.2 % — SIGNIFICANT CHANGE UP (ref 11.5–14.5)
SODIUM SERPL-SCNC: 139 MMOL/L — SIGNIFICANT CHANGE UP (ref 135–146)
WBC # BLD: 7.27 K/UL — SIGNIFICANT CHANGE UP (ref 4.8–10.8)
WBC # FLD AUTO: 7.27 K/UL — SIGNIFICANT CHANGE UP (ref 4.8–10.8)

## 2019-08-09 PROCEDURE — 10060 I&D ABSCESS SIMPLE/SINGLE: CPT

## 2019-08-09 PROCEDURE — 99233 SBSQ HOSP IP/OBS HIGH 50: CPT

## 2019-08-09 RX ORDER — HYDROMORPHONE HYDROCHLORIDE 2 MG/ML
0.25 INJECTION INTRAMUSCULAR; INTRAVENOUS; SUBCUTANEOUS
Refills: 0 | Status: DISCONTINUED | OUTPATIENT
Start: 2019-08-09 | End: 2019-08-09

## 2019-08-09 RX ORDER — PANTOPRAZOLE SODIUM 20 MG/1
40 TABLET, DELAYED RELEASE ORAL
Refills: 0 | Status: DISCONTINUED | OUTPATIENT
Start: 2019-08-09 | End: 2019-08-11

## 2019-08-09 RX ORDER — LANOLIN ALCOHOL/MO/W.PET/CERES
5 CREAM (GRAM) TOPICAL AT BEDTIME
Refills: 0 | Status: DISCONTINUED | OUTPATIENT
Start: 2019-08-09 | End: 2019-08-11

## 2019-08-09 RX ORDER — ONDANSETRON 8 MG/1
4 TABLET, FILM COATED ORAL EVERY 4 HOURS
Refills: 0 | Status: DISCONTINUED | OUTPATIENT
Start: 2019-08-09 | End: 2019-08-09

## 2019-08-09 RX ORDER — SODIUM CHLORIDE 9 MG/ML
1000 INJECTION, SOLUTION INTRAVENOUS
Refills: 0 | Status: DISCONTINUED | OUTPATIENT
Start: 2019-08-09 | End: 2019-08-10

## 2019-08-09 RX ORDER — KETOROLAC TROMETHAMINE 30 MG/ML
30 SYRINGE (ML) INJECTION EVERY 6 HOURS
Refills: 0 | Status: DISCONTINUED | OUTPATIENT
Start: 2019-08-09 | End: 2019-08-09

## 2019-08-09 RX ORDER — SODIUM CHLORIDE 9 MG/ML
1000 INJECTION INTRAMUSCULAR; INTRAVENOUS; SUBCUTANEOUS
Refills: 0 | Status: DISCONTINUED | OUTPATIENT
Start: 2019-08-09 | End: 2019-08-09

## 2019-08-09 RX ORDER — MORPHINE SULFATE 50 MG/1
2 CAPSULE, EXTENDED RELEASE ORAL EVERY 8 HOURS
Refills: 0 | Status: DISCONTINUED | OUTPATIENT
Start: 2019-08-09 | End: 2019-08-09

## 2019-08-09 RX ORDER — CHLORHEXIDINE GLUCONATE 213 G/1000ML
1 SOLUTION TOPICAL
Refills: 0 | Status: DISCONTINUED | OUTPATIENT
Start: 2019-08-09 | End: 2019-08-11

## 2019-08-09 RX ORDER — OXYCODONE HYDROCHLORIDE 5 MG/1
5 TABLET ORAL EVERY 4 HOURS
Refills: 0 | Status: DISCONTINUED | OUTPATIENT
Start: 2019-08-09 | End: 2019-08-11

## 2019-08-09 RX ORDER — VANCOMYCIN HCL 1 G
1000 VIAL (EA) INTRAVENOUS EVERY 12 HOURS
Refills: 0 | Status: DISCONTINUED | OUTPATIENT
Start: 2019-08-09 | End: 2019-08-10

## 2019-08-09 RX ORDER — ENOXAPARIN SODIUM 100 MG/ML
40 INJECTION SUBCUTANEOUS DAILY
Refills: 0 | Status: DISCONTINUED | OUTPATIENT
Start: 2019-08-09 | End: 2019-08-11

## 2019-08-09 RX ORDER — HYDROMORPHONE HYDROCHLORIDE 2 MG/ML
0.5 INJECTION INTRAMUSCULAR; INTRAVENOUS; SUBCUTANEOUS
Refills: 0 | Status: DISCONTINUED | OUTPATIENT
Start: 2019-08-09 | End: 2019-08-09

## 2019-08-09 RX ORDER — ACETAMINOPHEN 500 MG
650 TABLET ORAL EVERY 6 HOURS
Refills: 0 | Status: DISCONTINUED | OUTPATIENT
Start: 2019-08-09 | End: 2019-08-09

## 2019-08-09 RX ORDER — ALPRAZOLAM 0.25 MG
0.25 TABLET ORAL ONCE
Refills: 0 | Status: DISCONTINUED | OUTPATIENT
Start: 2019-08-09 | End: 2019-08-09

## 2019-08-09 RX ORDER — SODIUM CHLORIDE 9 MG/ML
500 INJECTION, SOLUTION INTRAVENOUS ONCE
Refills: 0 | Status: COMPLETED | OUTPATIENT
Start: 2019-08-09 | End: 2019-08-09

## 2019-08-09 RX ORDER — ACETAMINOPHEN 500 MG
650 TABLET ORAL EVERY 6 HOURS
Refills: 0 | Status: DISCONTINUED | OUTPATIENT
Start: 2019-08-09 | End: 2019-08-11

## 2019-08-09 RX ADMIN — Medication 0.25 MILLIGRAM(S): at 11:49

## 2019-08-09 RX ADMIN — OXYCODONE HYDROCHLORIDE 5 MILLIGRAM(S): 5 TABLET ORAL at 23:36

## 2019-08-09 RX ADMIN — SODIUM CHLORIDE 100 MILLILITER(S): 9 INJECTION INTRAMUSCULAR; INTRAVENOUS; SUBCUTANEOUS at 00:00

## 2019-08-09 RX ADMIN — SODIUM CHLORIDE 100 MILLILITER(S): 9 INJECTION INTRAMUSCULAR; INTRAVENOUS; SUBCUTANEOUS at 14:21

## 2019-08-09 RX ADMIN — Medication 30 MILLIGRAM(S): at 08:58

## 2019-08-09 RX ADMIN — HYDROMORPHONE HYDROCHLORIDE 0.5 MILLIGRAM(S): 2 INJECTION INTRAMUSCULAR; INTRAVENOUS; SUBCUTANEOUS at 14:26

## 2019-08-09 RX ADMIN — CHLORHEXIDINE GLUCONATE 1 APPLICATION(S): 213 SOLUTION TOPICAL at 05:14

## 2019-08-09 RX ADMIN — Medication 250 MILLIGRAM(S): at 05:13

## 2019-08-09 RX ADMIN — Medication 30 MILLIGRAM(S): at 09:28

## 2019-08-09 RX ADMIN — Medication 250 MILLIGRAM(S): at 17:37

## 2019-08-09 RX ADMIN — ENOXAPARIN SODIUM 40 MILLIGRAM(S): 100 INJECTION SUBCUTANEOUS at 11:42

## 2019-08-09 RX ADMIN — PANTOPRAZOLE SODIUM 40 MILLIGRAM(S): 20 TABLET, DELAYED RELEASE ORAL at 07:03

## 2019-08-09 RX ADMIN — SODIUM CHLORIDE 1500 MILLILITER(S): 9 INJECTION, SOLUTION INTRAVENOUS at 14:20

## 2019-08-09 NOTE — PROGRESS NOTE ADULT - SUBJECTIVE AND OBJECTIVE BOX
MARLON SANDOVAL  43y  Female      Patient is a 43y old  Female who presents with a chief complaint of Right axillary abscess (09 Aug 2019 04:17)      INTERVAL HPI/OVERNIGHT EVENTS:      ******************************* REVIEW OF SYSTEMS:**********************************************    Resting in bed , no acute distress.   All other review of systems negative    *********************** VITALS ******************************************    T(F): 97.6 (08-09-19 @ 12:10)  HR: 79 (08-09-19 @ 12:10) (79 - 86)  BP: 112/59 (08-09-19 @ 12:10) (110/59 - 112/59)  RR: 19 (08-09-19 @ 12:10) (14 - 19)  SpO2: 100% (08-09-19 @ 07:44) (100% - 100%)    08-08-19 @ 07:01  -  08-09-19 @ 07:00  --------------------------------------------------------  IN: 100 mL / OUT: 0 mL / NET: 100 mL            08-08-19 @ 07:01  -  08-09-19 @ 07:00  --------------------------------------------------------  IN: 100 mL / OUT: 0 mL / NET: 100 mL        ******************************** PHYSICAL EXAM:**************************************************  GENERAL: NAD    PSYCH: no agitation, baseline mentation  HEENT:     NERVOUS SYSTEM:  Alert & Oriented X3, MS  5/5 B/L  UE and LE ; Sensory intact    PULMONARY: NITIN, CTA    CARDIOVASCULAR: S1S2 RRR    GI: Soft, NT, ND; BS present.    EXTREMITIES:  2+ Peripheral Pulses, No clubbing, cyanosis, or edema    LYMPH: No lymphadenopathy noted    SKIN: No rashes or lesions    ******************************************************************************************    Consultant(s) Notes Reviewed:  [x ] YES  [ ] NO    Discussed with Consultants/Other Providers [ x] YES     **************************** LABS *******************************************************                          8.8    7.27  )-----------( 314      ( 09 Aug 2019 01:43 )             27.7     08-09    139  |  104  |  5<L>  ----------------------------<  90  4.2   |  25  |  0.6<L>    Ca    8.5      09 Aug 2019 01:43  Phos  3.8     08-09  Mg     1.9     08-09    TPro  6.8  /  Alb  3.9  /  TBili  0.3  /  DBili  x   /  AST  29  /  ALT  36  /  AlkPhos  77  08-07        PT/INR - ( 09 Aug 2019 01:43 )   PT: 12.70 sec;   INR: 1.11 ratio         PTT - ( 09 Aug 2019 01:43 )  PTT:31.9 sec  Lactate Trend  08-07 @ 17:12 Lactate:1.0         CAPILLARY BLOOD GLUCOSE              **************************Active Medications *******************************************  penicillins (Unknown)      acetaminophen    Suspension .. 650 milliGRAM(s) Oral every 6 hours PRN  chlorhexidine 4% Liquid 1 Application(s) Topical <User Schedule>  enoxaparin Injectable 40 milliGRAM(s) SubCutaneous daily  ketorolac   Injectable 30 milliGRAM(s) IV Push every 6 hours PRN  melatonin 5 milliGRAM(s) Oral at bedtime  pantoprazole    Tablet 40 milliGRAM(s) Oral before breakfast  vancomycin  IVPB 1000 milliGRAM(s) IV Intermittent every 12 hours      ***************************************************  RADIOLOGY & ADDITIONAL TESTS:    Imaging Personally Reviewed:  [ ] YES  [ ] NO    HEALTH ISSUES - PROBLEM Dx:

## 2019-08-09 NOTE — PROGRESS NOTE ADULT - ASSESSMENT
42 yo F with PMH of anxiety, GERD presents to the ED c/o abscess to right axilla that has been getting progressively larger in size and more painful x 1 week.    Right axillary abscess / cellulitis  - failed outpt oral abx  -s/p 2 IND   Awating another  I&D today.   -Will treat with IV Vancomycin for now.   - Remains Afebrile; denies chills  - WBC 13.4 on admission; refused morning labs  - 1 set of blood culture: NEGATIVE    GERD  -Pantoprazole    Anxiety  -According to patient is stable and not on any medications.    Diet - Regular  DVT - Lovenox  GI - PPI  FULL CODE    #Progress Note Handoff  Pending (specify):  I&D by Surgery.   Family discussion: n/a ,d/w the patient.   Disposition: Home_

## 2019-08-09 NOTE — CHART NOTE - NSCHARTNOTEFT_GEN_A_CORE
Post Operative Check    Patient is post op from a Incision and drainage of single abscess ([id number])   and is sitting up in bed, denies any current pain.     Vitals    T(C): 35.8 (08-09-19 @ 15:17), Max: 36.4 (08-09-19 @ 12:10)  HR: 72 (08-09-19 @ 15:17) (52 - 86)  BP: 102/65 (08-09-19 @ 15:17) (83/48 - 112/59)  RR: 20 (08-09-19 @ 15:17) (12 - 20)  SpO2: 95% (08-09-19 @ 14:50) (95% - 100%)  Wt(kg): --      08-08 @ 07:01  -  08-09 @ 07:00  --------------------------------------------------------  IN:    sodium chloride 0.9%: 100 mL  Total IN: 100 mL    OUT:  Total OUT: 0 mL    Total NET: 100 mL          Labs                        8.8    7.27  )-----------( 314      ( 09 Aug 2019 01:43 )             27.7       CBC Full  -  ( 09 Aug 2019 01:43 )  WBC Count : 7.27 K/uL  Hemoglobin : 8.8 g/dL  Hematocrit : 27.7 %  Platelet Count - Automated : 314 K/uL  Mean Cell Volume : 88.5 fL  Mean Cell Hemoglobin : 28.1 pg  Mean Cell Hemoglobin Concentration : 31.8 g/dL  Auto Neutrophil # : x  Auto Lymphocyte # : x  Auto Monocyte # : x  Auto Eosinophil # : x  Auto Basophil # : x  Auto Neutrophil % : x  Auto Lymphocyte % : x  Auto Monocyte % : x  Auto Eosinophil % : x  Auto Basophil % : x      Physical Exam  General: NAD AAOx3   Cards: RRR S1S2  Resp: CTAB  Axilla : Right axilla, covered with gauze and foam tape  Abdomen: Soft, NT  Ext: NTBL    Patient is a 43y old Female s/p incision and drainage of right axilla.  -Will change dressing tomorrow  -To be discharged with 5 days of augmentin.

## 2019-08-09 NOTE — BRIEF OPERATIVE NOTE - NSICDXBRIEFPROCEDURE_GEN_ALL_CORE_FT
PROCEDURES:  Incision and drainage of single abscess 09-Aug-2019 14:06:15 right axilla Salvador Mccoy

## 2019-08-09 NOTE — CHART NOTE - NSCHARTNOTEFT_GEN_A_CORE
Preop Dx: axillary abscess   Surgeon: Dr. Pastrana  Procedure: I and D     Vital Signs Last 24 Hrs  T(C): 36.3 (08 Aug 2019 23:12), Max: 37.2 (08 Aug 2019 12:36)  T(F): 97.4 (08 Aug 2019 20:51), Max: 98.9 (08 Aug 2019 12:36)  HR: 86 (08 Aug 2019 23:12) (86 - 99)  BP: 110/59 (08 Aug 2019 23:12) (110/59 - 112/57)  BP(mean): --  RR: 14 (08 Aug 2019 23:12) (14 - 14)  SpO2: --                        8.8    7.27  )-----------( 314      ( 09 Aug 2019 01:43 )             27.7     08-09    139  |  104  |  5<L>  ----------------------------<  90  4.2   |  25  |  0.6<L>    Ca    8.5      09 Aug 2019 01:43  Phos  3.8     08-09  Mg     1.9     08-09    TPro  6.8  /  Alb  3.9  /  TBili  0.3  /  DBili  x   /  AST  29  /  ALT  36  /  AlkPhos  77  08-07    PT/INR - ( 09 Aug 2019 01:43 )   PT: 12.70 sec;   INR: 1.11 ratio         PTT - ( 09 Aug 2019 01:43 )  PTT:31.9 sec  Daily Height in cm: 154.94 (08 Aug 2019 23:12)    Daily     ECG: not done     CXR: < from: Xray Chest 1 View- PORTABLE-Urgent (08.08.19 @ 14:32) >    Impression:      No radiographic evidence of acute cardiopulmonary disease.    < end of copied text >        Type and Screen: Type + Screen (08.08.19 @ 17:43)    ABO RH Interpretation: O POS      A/P: 43y Female with axillary abscess     - OR 8/9/2019 for I + D with Dr. Pastrana  - NPO past midnight, except medications  - IVF while NPO  - Consent signed and in chart

## 2019-08-09 NOTE — PROGRESS NOTE ADULT - SUBJECTIVE AND OBJECTIVE BOX
SUBJECTIVE:    Patient is a 43y old Female who presents with a chief complaint of Right axillary abscess (09 Aug 2019 13:06)    Currently admitted to medicine with the primary diagnosis of Failure of outpatient treatment     Today is hospital day 2d. This morning she is resting comfortably in bed and reports no new issues or overnight events.   Pt. denies HA, Cp, Abd Pain, discomfort, fever or chills.     PAST MEDICAL & SURGICAL HISTORY  Anxiety  GERD (gastroesophageal reflux disease)    SOCIAL HISTORY:  Negative for smoking/alcohol/drug use.     ALLERGIES:  penicillins (Unknown)    MEDICATIONS:  STANDING MEDICATIONS  acetaminophen    Suspension .. 650 milliGRAM(s) Oral every 6 hours PRN Mild Pain (1 - 3), Moderate Pain (4 - 6), Severe Pain (7 - 10)  chlorhexidine 4% Liquid 1 Application(s) Topical <User Schedule>  enoxaparin Injectable 40 milliGRAM(s) SubCutaneous daily  ketorolac   Injectable 30 milliGRAM(s) IV Push every 6 hours PRN Severe Pain (7 - 10)  melatonin 5 milliGRAM(s) Oral at bedtime  pantoprazole    Tablet 40 milliGRAM(s) Oral before breakfast  vancomycin  IVPB 1000 milliGRAM(s) IV Intermittent every 12 hours    PRN MEDICATIONS  acetaminophen    Suspension .. 650 milliGRAM(s) Oral every 6 hours PRN  ketorolac   Injectable 30 milliGRAM(s) IV Push every 6 hours PRN    VITALS:   T(F): 97.6  HR: 79 (79 - 86)  BP: 112/59 (110/59 - 112/59)  RR: 19 (14 - 19)  SpO2: 100% (100% - 100%)    LABS:                        8.8    7.27  )-----------( 314      ( 09 Aug 2019 01:43 )             27.7     08-09    139  |  104  |  5<L>  ----------------------------<  90  4.2   |  25  |  0.6<L>    Ca    8.5      09 Aug 2019 01:43  Phos  3.8     08-09  Mg     1.9     08-09    TPro  6.8  /  Alb  3.9  /  TBili  0.3  /  DBili  x   /  AST  29  /  ALT  36  /  AlkPhos  77  08-07    PT/INR - ( 09 Aug 2019 01:43 )   PT: 12.70 sec;   INR: 1.11 ratio         PTT - ( 09 Aug 2019 01:43 )  PTT:31.9 sec      Culture - Blood (collected 07 Aug 2019 17:12)  Source: .Blood Blood  Preliminary Report (09 Aug 2019 06:01):    No growth to date.    PHYSICAL EXAM:  GEN: In no acute distress. Pt. is awake in bed able to have a conversation.  LUNGS: CTABL, Symmetrical inspiration, no increased work of breathing  HEART: +S1,S2, RRR, No murmurs, Rubs, Gallops   ABD: Bowel Sounds Present, Soft, non tender, non distended, no guarding, no rebound.   EXT: 2+ peripheral Pulses, no clubbing, no cyanosis, no Edema. No Calf tenderness bilaterally; Right axillary abscess still present however area of redness and tenderness improved from yesterday.  NEURO: AAOX3. No focal deficits.

## 2019-08-09 NOTE — PROGRESS NOTE ADULT - ASSESSMENT
43F w/ R axillary abscess.     Plan:   - OR for I and D today   - consent in chart   -pre-op labs completed

## 2019-08-09 NOTE — PROGRESS NOTE ADULT - SUBJECTIVE AND OBJECTIVE BOX
GENERAL SURGERY PROGRESS NOTE     MARLON SANDOVAL  43y  Female  Hospital day :2d  POD:  Procedure:   OVERNIGHT EVENTS: No acute events. Patient refused labs multiple times.     T(F): 97.4 (08-08-19 @ 20:51), Max: 98.9 (08-08-19 @ 12:36)  HR: 86 (08-08-19 @ 23:12) (86 - 99)  BP: 110/59 (08-08-19 @ 23:12) (110/59 - 112/57)  ABP: --  ABP(mean): --  RR: 14 (08-08-19 @ 23:12) (14 - 14)  SpO2: --    DIET/FLUIDS: sodium chloride 0.9%. 1000 milliLiter(s) IV Continuous <Continuous>     GI proph:  pantoprazole    Tablet 40 milliGRAM(s) Oral before breakfast    AC/ proph:   ABx: vancomycin  IVPB 1000 milliGRAM(s) IV Intermittent every 12 hours      PHYSICAL EXAM:  GENERAL: NAD, well-appearing, erythematous soft tissue collection in right axilla. Decreased in size based on marking   CHEST/LUNG: Clear to auscultation bilaterally  HEART: Regular rate and rhythm  ABDOMEN: Soft, Nontender, Nondistended;   EXTREMITIES:  No clubbing, cyanosis, or edema      LABS  Labs:  CAPILLARY BLOOD GLUCOSE                              8.8    7.27  )-----------( 314      ( 09 Aug 2019 01:43 )             27.7         08-09    139  |  104  |  5<L>  ----------------------------<  90  4.2   |  25  |  0.6<L>      Calcium, Total Serum: 8.5 mg/dL (08-09-19 @ 01:43)      LFTs:             6.8  | 0.3  | 29       ------------------[77      ( 07 Aug 2019 17:12 )  3.9  | x    | 36          Lipase:x      Amylase:x         Lactate, Blood: 1.0 mmol/L (08-07-19 @ 17:12)      Coags:     12.70  ----< 1.11    ( 09 Aug 2019 01:43 )     31.9          RADIOLOGY & ADDITIONAL TESTS:    < from: Xray Chest 1 View- PORTABLE-Urgent (08.08.19 @ 14:32) >    Impression:      No radiographic evidence of acute cardiopulmonary disease.    < end of copied text >

## 2019-08-09 NOTE — PROGRESS NOTE ADULT - ASSESSMENT
44 yo F with PMH of anxiety, GERD presents to the ED c/o abscess to right axilla that has been getting progressively larger in size and more painful x 1 week.    Right axillary cellulitis  -Already treated with Bactrim and clindamycin oral  -s/p 2 IND  -Redness and tenderness 4*4cm area in the right axilla improving; tenderness extending beyond confines of abcess  -Continue to treat with IV Vancomycin.  -Would like General Anaesthesia if I and D required.  - Remains Afebrile; denies chills  - WBC 13.4 - overnight 7.27  - 1 set of blood cultures negative  - OR today    GERD  -Pantoprazole    Anxiety  -According to patient is stable and not on any medications.    Diet - npo after midnight for sx; regular otherwise  DVT - Lovenox  GI - PPI  FULL CODE

## 2019-08-10 ENCOUNTER — TRANSCRIPTION ENCOUNTER (OUTPATIENT)
Age: 44
End: 2019-08-10

## 2019-08-10 LAB
NIGHT BLUE STAIN TISS: SIGNIFICANT CHANGE UP
SPECIMEN SOURCE: SIGNIFICANT CHANGE UP

## 2019-08-10 PROCEDURE — 99232 SBSQ HOSP IP/OBS MODERATE 35: CPT

## 2019-08-10 PROCEDURE — 99232 SBSQ HOSP IP/OBS MODERATE 35: CPT | Mod: 24

## 2019-08-10 RX ORDER — SACCHAROMYCES BOULARDII 250 MG
250 POWDER IN PACKET (EA) ORAL
Refills: 0 | Status: DISCONTINUED | OUTPATIENT
Start: 2019-08-10 | End: 2019-08-11

## 2019-08-10 RX ORDER — HYDROMORPHONE HYDROCHLORIDE 2 MG/ML
0.25 INJECTION INTRAMUSCULAR; INTRAVENOUS; SUBCUTANEOUS ONCE
Refills: 0 | Status: DISCONTINUED | OUTPATIENT
Start: 2019-08-10 | End: 2019-08-10

## 2019-08-10 RX ADMIN — Medication 650 MILLIGRAM(S): at 11:52

## 2019-08-10 RX ADMIN — OXYCODONE HYDROCHLORIDE 5 MILLIGRAM(S): 5 TABLET ORAL at 10:27

## 2019-08-10 RX ADMIN — OXYCODONE HYDROCHLORIDE 5 MILLIGRAM(S): 5 TABLET ORAL at 16:31

## 2019-08-10 RX ADMIN — Medication 5 MILLIGRAM(S): at 00:46

## 2019-08-10 RX ADMIN — Medication 5 MILLIGRAM(S): at 21:34

## 2019-08-10 RX ADMIN — HYDROMORPHONE HYDROCHLORIDE 0.25 MILLIGRAM(S): 2 INJECTION INTRAMUSCULAR; INTRAVENOUS; SUBCUTANEOUS at 11:34

## 2019-08-10 RX ADMIN — OXYCODONE HYDROCHLORIDE 5 MILLIGRAM(S): 5 TABLET ORAL at 10:57

## 2019-08-10 RX ADMIN — Medication 250 MILLIGRAM(S): at 06:44

## 2019-08-10 RX ADMIN — Medication 650 MILLIGRAM(S): at 19:45

## 2019-08-10 RX ADMIN — Medication 650 MILLIGRAM(S): at 12:22

## 2019-08-10 RX ADMIN — OXYCODONE HYDROCHLORIDE 5 MILLIGRAM(S): 5 TABLET ORAL at 00:06

## 2019-08-10 RX ADMIN — OXYCODONE HYDROCHLORIDE 5 MILLIGRAM(S): 5 TABLET ORAL at 16:30

## 2019-08-10 RX ADMIN — Medication 100 MILLIGRAM(S): at 17:17

## 2019-08-10 RX ADMIN — PANTOPRAZOLE SODIUM 40 MILLIGRAM(S): 20 TABLET, DELAYED RELEASE ORAL at 06:44

## 2019-08-10 RX ADMIN — Medication 250 MILLIGRAM(S): at 17:17

## 2019-08-10 RX ADMIN — HYDROMORPHONE HYDROCHLORIDE 0.25 MILLIGRAM(S): 2 INJECTION INTRAMUSCULAR; INTRAVENOUS; SUBCUTANEOUS at 11:50

## 2019-08-10 NOTE — DISCHARGE NOTE PROVIDER - NSDCCPCAREPLAN_GEN_ALL_CORE_FT
PRINCIPAL DISCHARGE DIAGNOSIS  Diagnosis: Abscess  Assessment and Plan of Treatment: You were admitted for an infection located in your right arm pit that was unable to be managed outpatient. While in patient, you had surgery to remove the collection of infection and were treated with antibiotics. You will go home with packing in the wound and should change this daily as well as continue on oral antibiotics.

## 2019-08-10 NOTE — DISCHARGE NOTE PROVIDER - NSDCFUADDINST_GEN_ALL_CORE_FT
Change packing daily with visiting nurse services. Continue antibiotics as directed. Followup with primary care doctor to discuss hospitalization and wound care.

## 2019-08-10 NOTE — DISCHARGE NOTE PROVIDER - CARE PROVIDER_API CALL
Marcelino Mart (DO)  Internal Medicine  [address] [place] [address]  Phone: [phone]  Fax: [phone]  Follow Up Time: 1-3 days

## 2019-08-10 NOTE — DISCHARGE NOTE PROVIDER - NSDCCPTREATMENT_GEN_ALL_CORE_FT
PRINCIPAL PROCEDURE  Procedure: Incision and drainage of simple skin abscess  Findings and Treatment: A minor procedure was performed to drain and clear the pocket of infection from under your arm. The procedure was uncomplicated.

## 2019-08-10 NOTE — PROGRESS NOTE ADULT - ASSESSMENT
43F s/p I&D of R axillary abscess.     Plan:   -will come to change packing today  -will need VNS for home packing changes  -DC on 5 days of augmentin

## 2019-08-10 NOTE — PROGRESS NOTE ADULT - SUBJECTIVE AND OBJECTIVE BOX
GENERAL SURGERY PROGRESS NOTE     MARLON SANDOVAL  43y  Female  Hospital day :3d  POD:  Procedure: Incision and drainage of single abscess    OVERNIGHT EVENTS:  s/p I&D of axillary abscess.     T(F): 98 (08-09-19 @ 23:41), Max: 98 (08-09-19 @ 23:41)  HR: 82 (08-09-19 @ 23:41) (52 - 82)  BP: 104/56 (08-09-19 @ 23:41) (83/48 - 112/59)  ABP: --  ABP(mean): --  RR: 18 (08-09-19 @ 23:41) (12 - 20)  SpO2: 95% (08-09-19 @ 14:50) (95% - 98%)    DIET/FLUIDS: multiple electrolytes Injection Type 1 1000 milliLiter(s) IV Continuous <Continuous>    GI proph:  pantoprazole    Tablet 40 milliGRAM(s) Oral before breakfast    ABx: vancomycin  IVPB 1000 milliGRAM(s) IV Intermittent every 12 hours      PHYSICAL EXAM:  GENERAL: NAD, well-appearing  CHEST/LUNG: Clear to auscultation bilaterally  HEART: Regular rate and rhythm  ABDOMEN: Soft, Nontender, Nondistended;   EXTREMITIES: R axillary dressing in place      LABS  Labs:  CAPILLARY BLOOD GLUCOSE                        8.8    7.27  )-----------( 314      ( 09 Aug 2019 01:43 )             27.7        08-09    139  |  104  |  5<L>  ----------------------------<  90  4.2   |  25  |  0.6<L>          LFTs:     Lactate, Blood: 1.0 mmol/L (08-07-19 @ 17:12)      Coags:     12.70  ----< 1.11    ( 09 Aug 2019 01:43 )     31.9      Culture - Blood (collected 07 Aug 2019 17:12)  Source: .Blood Blood  Preliminary Report (09 Aug 2019 06:01):    No growth to date.

## 2019-08-10 NOTE — PROGRESS NOTE ADULT - ATTENDING COMMENTS
packing removed   antibiotics
packing removed.  vns for packing   antibiotics
right axillary abscess   for I&D in the OR   d/c post op
right axillary abscess   s/p multiple I&D by urgent care   large induration n with fluctuance and no drainage   refusing bedside drainage   continue Antibiotics   will schedule for drainage in the OR
Patient seen and examined independently. Agree with resident note.  # Rt axillary cellulitis-- s/p I&D on doxycycline--alllergic to PCN surgery wanted augmentin. VNS services for dressing change.  DC plan AM.

## 2019-08-10 NOTE — PROGRESS NOTE ADULT - SUBJECTIVE AND OBJECTIVE BOX
SUBJECTIVE:    Patient is a 43y old Female who presents with a chief complaint of Right axillary abscess (10 Aug 2019 09:15)    Currently admitted to medicine with the primary diagnosis of Failure of outpatient treatment     Today is hospital day 3d. This morning she is resting comfortably in bed and reports no new issues or overnight events. S/p I&D in the OR yesterday.  Denies any fever, chills, CP, shortness of breath, abdominal pain. Complaining of some pain at site of incision; improved since yesterday.     PAST MEDICAL & SURGICAL HISTORY  Anxiety  GERD (gastroesophageal reflux disease)    SOCIAL HISTORY:  Negative for smoking/alcohol/drug use.     ALLERGIES:  penicillins (Unknown)    MEDICATIONS:  STANDING MEDICATIONS  acetaminophen    Suspension .. 650 milliGRAM(s) Oral every 6 hours PRN Mild Pain (1 - 3), Moderate Pain (4 - 6), Severe Pain (7 - 10)  chlorhexidine 4% Liquid 1 Application(s) Topical <User Schedule>  doxycycline hyclate Capsule 100 milliGRAM(s) Oral every 12 hours  enoxaparin Injectable 40 milliGRAM(s) SubCutaneous daily  melatonin 5 milliGRAM(s) Oral at bedtime  oxyCODONE    IR 5 milliGRAM(s) Oral every 4 hours PRN Severe Pain (7 - 10)  pantoprazole    Tablet 40 milliGRAM(s) Oral before breakfast  saccharomyces boulardii 250 milliGRAM(s) Oral two times a day    PRN MEDICATIONS  acetaminophen    Suspension .. 650 milliGRAM(s) Oral every 6 hours PRN  oxyCODONE    IR 5 milliGRAM(s) Oral every 4 hours PRN    VITALS:   T(F): 98  HR: 82 (52 - 82)  BP: 104/56 (83/48 - 112/59)  RR: 18 (12 - 20)  SpO2: 95% (95% - 98%)    LABS:                        8.8    7.27  )-----------( 314      ( 09 Aug 2019 01:43 )             27.7     08-09    139  |  104  |  5<L>  ----------------------------<  90  4.2   |  25  |  0.6<L>    Ca    8.5      09 Aug 2019 01:43  Phos  3.8     08-09  Mg     1.9     08-09      PT/INR - ( 09 Aug 2019 01:43 )   PT: 12.70 sec;   INR: 1.11 ratio         PTT - ( 09 Aug 2019 01:43 )  PTT:31.9 sec          Culture - Blood (collected 07 Aug 2019 17:12)  Source: .Blood Blood  Preliminary Report (09 Aug 2019 06:01):    No growth to date.  PHYSICAL EXAM:  GEN: In no acute distress. Pt. is awake in bed able to have a conversation.  LUNGS: CTABL, Symmetrical inspiration, no increased work of breathing  HEART: +S1,S2, RRR, No murmurs, Rubs, Gallops   ABD: Bowel Sounds Present, Soft, non tender, non distended, no guarding, no rebound.   EXT: 2+ peripheral Pulses, no clubbing, no cyanosis, no Edema. R axilla dressing dry packing  NEURO: AAOX3. No focal deficits.

## 2019-08-10 NOTE — PROGRESS NOTE ADULT - ASSESSMENT
A/P:  MARLON SANDOVAL is a 43yFemale POD 1 from Incision and drainage of single abscess.    Plan:   Dressing changes  d/c with augmentin 5d

## 2019-08-10 NOTE — DISCHARGE NOTE PROVIDER - HOSPITAL COURSE
44 yo F with PMH of anxiety, GERD presents to the ED c/o abscess to right axilla that has been getting progressively larger in size and more painful x 1 week.  Patient seen at  on 8/4 and had I&D with minimal drainage and started on clindamycin.    The size did not get smaller and according to the patient she has a history of MRSA infection( Does not remember where and when ). She went to the ED 2 days later and mentioned about MRSA, so antibiotics were changed to [id number]. It did not help and redness and swelling persisted and she presented to the ED. In the ED, she received one dose of IV vancomycin. On my examination she is resting comfortably, and mentions that if at all drainage and I and D is required she would like to go under general anesthesia and would not like local anesthesia.        In patient, patient was on vanco IV and went to the OR 8/9/19 for I and D. Patient remained afebrile through admission, with no leukocytosis, fever or chills. She was to be DC on augmentin however has PCN allergy, so was started on doxy PO. However, pt going to [facility] next week and wanted IV abx so was started on clinda IV. Tolerating well. Will need daily dressing changes at home with VNS - dry sterile gauze packing daily. She is appropriate for discharge.

## 2019-08-10 NOTE — PROGRESS NOTE ADULT - ASSESSMENT
44 yo F with PMH of anxiety, GERD presents to the ED c/o abscess to right axilla that has been getting progressively larger in size and more painful x 1 week.    Right axillary cellulitis  -Already treated with Bactrim and clindamycin oral  -s/p IND in the OR 8/10  -Redness and tenderness 4*4cm area in the right axilla improving; tenderness extending beyond confines of abscess  -Continue to treat with IV Vancomycin.  -Would like General Anaesthesia if I and D required.  - Remains Afebrile; denies chills  - WBC 13.4 - overnight 7.27  - 1 set of blood cultures negative  - OR today    GERD  -Pantoprazole    Anxiety  -According to patient is stable and not on any medications.    Diet - npo after midnight for sx; regular otherwise  DVT - Lovenox  GI - PPI  FULL CODE 42 yo F with PMH of anxiety, GERD presents to the ED c/o abscess to right axilla that has been getting progressively larger in size and more painful x 1 week.    Right axillary cellulitis  -Already treated with Bactrim and clindamycin oral  -s/p IND in the OR 8/10  -IV vanco d/c; doxycyline PO started - 5 days  - Remains Afebrile; denies chills  - no leukocytosis  - 1 set of blood cultures negative    GERD  -Pantoprazole    Anxiety  -According to patient is stable and not on any medications.    Diet - regular  DVT - Lovenox -refusing   GI - PPI  FULL CODE

## 2019-08-10 NOTE — PROGRESS NOTE ADULT - SUBJECTIVE AND OBJECTIVE BOX
GENERAL SURGERY PROGRESS NOTE     MARLON SANDOVAL  [address] [place] [address] day :3d  POD:  Procedure: Incision and drainage of single abscess    Surgical Attending: Tayler Early  Overnight events:  No acute events overnight. Pain well controlled. Denied f/c/cp/sob/n/v/.    T(F): 98 (08-09-19 @ 23:41), Max: 98 (08-09-19 @ 23:41)  HR: 82 (08-09-19 @ 23:41) (52 - 82)  BP: 104/56 (08-09-19 @ 23:41) (83/48 - 112/59)  ABP: --  ABP(mean): --  RR: 18 (08-09-19 @ 23:41) (12 - 20)  SpO2: 95% (08-09-19 @ 14:50) (95% - 98%)      DIET/FLUIDS: multiple electrolytes Injection Type 1 1000 milliLiter(s) IV Continuous <Continuous>    GI proph:  pantoprazole    Tablet 40 milliGRAM(s) Oral before breakfast    AC/ proph:   ABx: vancomycin  IVPB 1000 milliGRAM(s) IV Intermittent every 12 hours      PHYSICAL EXAM:  GENERAL: NAD, well-appearing  CHEST/LUNG: Clear to auscultation bilaterally. Right axilla packed and covered with foam tape  HEART: Regular rate and rhythm  ABDOMEN: Soft, Nontender, Nondistended;   EXTREMITIES:  No clubbing, cyanosis, or edema      LABS  Labs:  CAPILLARY BLOOD GLUCOSE                              8.8    7.27  )-----------( 314      ( 09 Aug 2019 01:43 )             27.7         08-09    139  |  104  |  5<L>  ----------------------------<  90  4.2   |  25  |  0.6<L>          LFTs:     Lactate, Blood: 1.0 mmol/L (08-07-19 @ 17:12)      Coags:     12.70  ----< 1.11    ( 09 Aug 2019 01:43 )     31.9          Culture - Blood (collected 07 Aug 2019 17:12)  Source: .Blood Blood  Preliminary Report (09 Aug 2019 06:01):    No growth to date.          RADIOLOGY & ADDITIONAL TESTS:

## 2019-08-11 ENCOUNTER — TRANSCRIPTION ENCOUNTER (OUTPATIENT)
Age: 44
End: 2019-08-11

## 2019-08-11 VITALS
DIASTOLIC BLOOD PRESSURE: 55 MMHG | RESPIRATION RATE: 18 BRPM | HEART RATE: 85 BPM | TEMPERATURE: 98 F | SYSTOLIC BLOOD PRESSURE: 112 MMHG

## 2019-08-11 LAB
-  AMPICILLIN/SULBACTAM: SIGNIFICANT CHANGE UP
-  CEFAZOLIN: SIGNIFICANT CHANGE UP
-  CLINDAMYCIN: SIGNIFICANT CHANGE UP
-  DAPTOMYCIN: SIGNIFICANT CHANGE UP
-  ERYTHROMYCIN: SIGNIFICANT CHANGE UP
-  GENTAMICIN: SIGNIFICANT CHANGE UP
-  LINEZOLID: SIGNIFICANT CHANGE UP
-  OXACILLIN: SIGNIFICANT CHANGE UP
-  PENICILLIN: SIGNIFICANT CHANGE UP
-  RIFAMPIN: SIGNIFICANT CHANGE UP
-  TETRACYCLINE: SIGNIFICANT CHANGE UP
-  TRIMETHOPRIM/SULFAMETHOXAZOLE: SIGNIFICANT CHANGE UP
-  VANCOMYCIN: SIGNIFICANT CHANGE UP
CULTURE RESULTS: SIGNIFICANT CHANGE UP
METHOD TYPE: SIGNIFICANT CHANGE UP
ORGANISM # SPEC MICROSCOPIC CNT: SIGNIFICANT CHANGE UP
ORGANISM # SPEC MICROSCOPIC CNT: SIGNIFICANT CHANGE UP
SPECIMEN SOURCE: SIGNIFICANT CHANGE UP

## 2019-08-11 PROCEDURE — 99239 HOSP IP/OBS DSCHRG MGMT >30: CPT

## 2019-08-11 RX ORDER — HYDROMORPHONE HYDROCHLORIDE 2 MG/ML
0.25 INJECTION INTRAMUSCULAR; INTRAVENOUS; SUBCUTANEOUS ONCE
Refills: 0 | Status: DISCONTINUED | OUTPATIENT
Start: 2019-08-11 | End: 2019-08-11

## 2019-08-11 RX ORDER — AZTREONAM 2 G
1 VIAL (EA) INJECTION
Qty: 10 | Refills: 0
Start: 2019-08-11 | End: 2019-08-15

## 2019-08-11 RX ADMIN — CHLORHEXIDINE GLUCONATE 1 APPLICATION(S): 213 SOLUTION TOPICAL at 06:34

## 2019-08-11 RX ADMIN — Medication 320 MILLIGRAM(S): at 15:27

## 2019-08-11 RX ADMIN — Medication 100 MILLIGRAM(S): at 00:55

## 2019-08-11 RX ADMIN — OXYCODONE HYDROCHLORIDE 5 MILLIGRAM(S): 5 TABLET ORAL at 15:26

## 2019-08-11 RX ADMIN — Medication 100 MILLIGRAM(S): at 10:33

## 2019-08-11 RX ADMIN — Medication 100 MILLIGRAM(S): at 17:05

## 2019-08-11 RX ADMIN — Medication 250 MILLIGRAM(S): at 06:34

## 2019-08-11 NOTE — PROGRESS NOTE ADULT - REASON FOR ADMISSION
Right axillary abscess

## 2019-08-11 NOTE — DISCHARGE NOTE NURSING/CASE MANAGEMENT/SOCIAL WORK - NSDCDPATPORTLINK_GEN_ALL_CORE
You can access the [email or web address], offered by [facility], by registering with the following website: [email or web address]

## 2019-08-11 NOTE — PROGRESS NOTE ADULT - SUBJECTIVE AND OBJECTIVE BOX
SUBJECTIVE:    Patient is a 43y old Female who presents with a chief complaint of Right axillary abscess (10 Aug 2019 20:59)    Currently admitted to medicine with the primary diagnosis of Abscess     Today is hospital day 4d.     PAST MEDICAL & SURGICAL HISTORY  Anxiety  GERD (gastroesophageal reflux disease)    ALLERGIES:  penicillins (Unknown)    MEDICATIONS:  STANDING MEDICATIONS  chlorhexidine 4% Liquid 1 Application(s) Topical <User Schedule>  clindamycin IVPB 300 milliGRAM(s) IV Intermittent every 8 hours  enoxaparin Injectable 40 milliGRAM(s) SubCutaneous daily  melatonin 5 milliGRAM(s) Oral at bedtime  pantoprazole    Tablet 40 milliGRAM(s) Oral before breakfast  saccharomyces boulardii 250 milliGRAM(s) Oral two times a day    PRN MEDICATIONS  acetaminophen    Suspension .. 650 milliGRAM(s) Oral every 6 hours PRN  oxyCODONE    IR 5 milliGRAM(s) Oral every 4 hours PRN    VITALS:   T(F): 98.2  HR: 81  BP: 103/61  RR: 18  SpO2: 96%    LABS:                    Culture - Acid Fast - Other w/Smear (collected 09 Aug 2019 13:51)  Source: .Other None    Culture - Abscess with Gram Stain (collected 09 Aug 2019 13:51)  Source: .Abscess None  Preliminary Report (10 Aug 2019 22:04):    Moderate Staphylococcus aureus          RADIOLOGY:    PHYSICAL EXAM:  GEN: No acute distress  LUNGS: Clear to auscultation bilaterally   HEART: S1/S2 present. RRR.   ABD/ GI: Soft, non-tender, non-distended. Bowel sounds present  EXT: NC/NC/NE/2+PP/ST  NEURO: AAOX3

## 2019-08-11 NOTE — PROGRESS NOTE ADULT - ASSESSMENT
44 yo F with PMH of anxiety, GERD presents to the ED c/o abscess to right axilla that has been getting progressively larger in size and more painful x 1 week.    Right axillary cellulitis  -Already treated with Bactrim and clindamycin oral  -s/p IND in the OR 8/10  -IV vanco d/c; doxycyline PO started - 5 days  - Remains Afebrile; denies chills  - no leukocytosis  - 1 set of blood cultures negative    GERD  -Pantoprazole    Anxiety  -According to patient is stable and not on any medications. 42 yo F with PMH of anxiety, GERD presents to the ED c/o abscess to right axilla that has been getting progressively larger in size and more painful x 1 week.    Right axillary cellulitis  -Already treated with Bactrim and clindamycin oral  -s/p IND in the OR 8/10  -was given iv ABX yesterday as per patient request  - Remains Afebrile; denies chills  - no leukocytosis  - 1 set of blood cultures negative    GERD  -Pantoprazole    Anxiety  -According to patient is stable and not on any medications.    patient is trying not to go-- and is talking of unrelated events-- was intended for DC yesterday, spent more than 30mins.  will send her home today with VNS service. 42 yo F with PMH of anxiety, GERD presents to the ED c/o abscess to right axilla that has been getting progressively larger in size and more painful x 1 week.    Right axillary cellulitis  -Already treated with Bactrim and clindamycin oral  -s/p IND in the OR 8/10  -was given iv ABX yesterday as per patient request-- she insisted on taking bactrim DS at DCas that works well for her  - Remains Afebrile; denies chills  - no leukocytosis  - 1 set of blood cultures negative    GERD  -Pantoprazole    Anxiety  -According to patient is stable and not on any medications.    patient is trying not to go-- and is talking of unrelated events-- was intended for DC yesterday, spent more than 30mins.  will send her home today with VNS service.

## 2019-08-13 LAB
CULTURE RESULTS: SIGNIFICANT CHANGE UP
SPECIMEN SOURCE: SIGNIFICANT CHANGE UP

## 2019-08-15 DIAGNOSIS — L03.111 CELLULITIS OF RIGHT AXILLA: ICD-10-CM

## 2019-08-15 DIAGNOSIS — F41.9 ANXIETY DISORDER, UNSPECIFIED: ICD-10-CM

## 2019-08-15 DIAGNOSIS — B95.62 METHICILLIN RESISTANT STAPHYLOCOCCUS AUREUS INFECTION AS THE CAUSE OF DISEASES CLASSIFIED ELSEWHERE: ICD-10-CM

## 2019-08-15 DIAGNOSIS — L02.411 CUTANEOUS ABSCESS OF RIGHT AXILLA: ICD-10-CM

## 2019-08-15 DIAGNOSIS — K21.9 GASTRO-ESOPHAGEAL REFLUX DISEASE WITHOUT ESOPHAGITIS: ICD-10-CM

## 2019-08-15 DIAGNOSIS — Z88.0 ALLERGY STATUS TO PENICILLIN: ICD-10-CM

## 2019-08-15 DIAGNOSIS — F17.210 NICOTINE DEPENDENCE, CIGARETTES, UNCOMPLICATED: ICD-10-CM

## 2019-09-07 LAB
CULTURE RESULTS: SIGNIFICANT CHANGE UP
SPECIMEN SOURCE: SIGNIFICANT CHANGE UP

## 2019-09-28 LAB
CULTURE RESULTS: SIGNIFICANT CHANGE UP
SPECIMEN SOURCE: SIGNIFICANT CHANGE UP

## 2019-12-31 PROBLEM — K21.9 GASTRO-ESOPHAGEAL REFLUX DISEASE WITHOUT ESOPHAGITIS: Chronic | Status: ACTIVE | Noted: 2019-08-07

## 2019-12-31 PROBLEM — F41.9 ANXIETY DISORDER, UNSPECIFIED: Chronic | Status: ACTIVE | Noted: 2019-08-07

## 2020-01-02 ENCOUNTER — OUTPATIENT (OUTPATIENT)
Dept: OUTPATIENT SERVICES | Facility: HOSPITAL | Age: 45
LOS: 1 days | Discharge: HOME | End: 2020-01-02
Payer: MEDICAID

## 2020-01-02 DIAGNOSIS — R92.8 OTHER ABNORMAL AND INCONCLUSIVE FINDINGS ON DIAGNOSTIC IMAGING OF BREAST: ICD-10-CM

## 2020-01-02 PROCEDURE — 76641 ULTRASOUND BREAST COMPLETE: CPT | Mod: 26,50

## 2020-01-02 PROCEDURE — G0279: CPT | Mod: 26

## 2020-01-02 PROCEDURE — 77066 DX MAMMO INCL CAD BI: CPT | Mod: 26

## 2020-06-01 ENCOUNTER — OUTPATIENT (OUTPATIENT)
Dept: OUTPATIENT SERVICES | Facility: HOSPITAL | Age: 45
LOS: 1 days | Discharge: HOME | End: 2020-06-01
Payer: MEDICAID

## 2020-06-01 DIAGNOSIS — R10.2 PELVIC AND PERINEAL PAIN: ICD-10-CM

## 2020-06-01 PROCEDURE — 76856 US EXAM PELVIC COMPLETE: CPT | Mod: 26

## 2020-06-01 PROCEDURE — 76830 TRANSVAGINAL US NON-OB: CPT | Mod: 26

## 2020-06-18 ENCOUNTER — APPOINTMENT (OUTPATIENT)
Dept: ANTEPARTUM | Facility: CLINIC | Age: 45
End: 2020-06-18

## 2020-06-19 ENCOUNTER — ASOB RESULT (OUTPATIENT)
Age: 45
End: 2020-06-19

## 2020-06-19 ENCOUNTER — OUTPATIENT (OUTPATIENT)
Dept: OUTPATIENT SERVICES | Facility: HOSPITAL | Age: 45
LOS: 1 days | Discharge: HOME | End: 2020-06-19

## 2020-06-19 ENCOUNTER — APPOINTMENT (OUTPATIENT)
Dept: ANTEPARTUM | Facility: CLINIC | Age: 45
End: 2020-06-19
Payer: MEDICAID

## 2020-06-19 DIAGNOSIS — O09.529 SUPERVISION OF ELDERLY MULTIGRAVIDA, UNSPECIFIED TRIMESTER: ICD-10-CM

## 2020-06-19 PROCEDURE — 76817 TRANSVAGINAL US OBSTETRIC: CPT | Mod: 26

## 2020-06-23 DIAGNOSIS — Z3A.08 8 WEEKS GESTATION OF PREGNANCY: ICD-10-CM

## 2020-06-23 DIAGNOSIS — O36.80X0 PREGNANCY WITH INCONCLUSIVE FETAL VIABILITY, NOT APPLICABLE OR UNSPECIFIED: ICD-10-CM

## 2020-06-23 DIAGNOSIS — Z78.9 OTHER SPECIFIED HEALTH STATUS: ICD-10-CM

## 2020-06-23 DIAGNOSIS — O09.529 SUPERVISION OF ELDERLY MULTIGRAVIDA, UNSPECIFIED TRIMESTER: ICD-10-CM

## 2020-07-15 ENCOUNTER — EMERGENCY (EMERGENCY)
Facility: HOSPITAL | Age: 45
LOS: 0 days | Discharge: HOME | End: 2020-07-15
Attending: EMERGENCY MEDICINE | Admitting: EMERGENCY MEDICINE
Payer: MEDICAID

## 2020-07-15 VITALS
OXYGEN SATURATION: 100 % | TEMPERATURE: 100 F | WEIGHT: 139.99 LBS | HEART RATE: 81 BPM | DIASTOLIC BLOOD PRESSURE: 57 MMHG | SYSTOLIC BLOOD PRESSURE: 97 MMHG | RESPIRATION RATE: 18 BRPM

## 2020-07-15 VITALS
DIASTOLIC BLOOD PRESSURE: 61 MMHG | TEMPERATURE: 99 F | SYSTOLIC BLOOD PRESSURE: 112 MMHG | HEART RATE: 71 BPM | OXYGEN SATURATION: 99 % | RESPIRATION RATE: 18 BRPM

## 2020-07-15 DIAGNOSIS — N93.9 ABNORMAL UTERINE AND VAGINAL BLEEDING, UNSPECIFIED: ICD-10-CM

## 2020-07-15 DIAGNOSIS — Z88.0 ALLERGY STATUS TO PENICILLIN: ICD-10-CM

## 2020-07-15 DIAGNOSIS — O20.0 THREATENED ABORTION: ICD-10-CM

## 2020-07-15 DIAGNOSIS — K21.9 GASTRO-ESOPHAGEAL REFLUX DISEASE WITHOUT ESOPHAGITIS: ICD-10-CM

## 2020-07-15 DIAGNOSIS — F41.9 ANXIETY DISORDER, UNSPECIFIED: ICD-10-CM

## 2020-07-15 DIAGNOSIS — Z3A.12 12 WEEKS GESTATION OF PREGNANCY: ICD-10-CM

## 2020-07-15 DIAGNOSIS — O09.521 SUPERVISION OF ELDERLY MULTIGRAVIDA, FIRST TRIMESTER: ICD-10-CM

## 2020-07-15 PROCEDURE — 99284 EMERGENCY DEPT VISIT MOD MDM: CPT

## 2020-07-15 PROCEDURE — 76830 TRANSVAGINAL US NON-OB: CPT | Mod: 26

## 2020-07-15 NOTE — ED PROVIDER NOTE - NSFOLLOWUPINSTRUCTIONS_ED_ALL_ED_FT
Threatened Miscarriage    A threatened miscarriage occurs when you have vaginal bleeding during your first 20 weeks of pregnancy but the pregnancy has not ended. If you have vaginal bleeding during this time, your health care provider will do tests to make sure you are still pregnant. If the tests show you are still pregnant and the developing baby (fetus) inside your womb (uterus) is still growing, your condition is considered a threatened miscarriage.    A threatened miscarriage does not mean your pregnancy will end, but it does increase the risk of losing your pregnancy (complete miscarriage).    CAUSES  The cause of a threatened miscarriage is usually not known. If you go on to have a complete miscarriage, the most common cause is an abnormal number of chromosomes in the developing baby. Chromosomes are the structures inside cells that hold all your genetic material.    Some causes of vaginal bleeding that do not result in miscarriage include:    Having sex.  Having an infection.  Normal hormone changes of pregnancy.  Bleeding that occurs when an egg implants in your uterus.    RISK FACTORS  Risk factors for bleeding in early pregnancy include:    Obesity.  Smoking.  Drinking excessive amounts of alcohol or caffeine.  Recreational drug use.    SIGNS AND SYMPTOMS  Light vaginal bleeding.   Mild abdominal pain or cramps.     DIAGNOSIS  If you have bleeding with or without abdominal pain before 20 weeks of pregnancy, your health care provider will do tests to check whether you are still pregnant. One important test involves using sound waves and a computer (ultrasound) to create images of the inside of your uterus. Other tests include an internal exam of your vagina and uterus (pelvic exam) and measurement of your baby's heart rate.     You may be diagnosed with a threatened miscarriage if:    Ultrasound testing shows you are still pregnant.  Your baby's heart rate is strong.  A pelvic exam shows that the opening between your uterus and your vagina (cervix) is closed.  Your heart rate and blood pressure are stable.  Blood tests confirm you are still pregnant.    TREATMENT  No treatments have been shown to prevent a threatened miscarriage from going on to a complete miscarriage. However, the right home care is important.     HOME CARE INSTRUCTIONS  Make sure you keep all your appointments for prenatal care. This is very important.  Get plenty of rest.  Do not have sex or use tampons if you have vaginal bleeding.  Do not douche.  Do not smoke or use recreational drugs.   Do not drink alcohol.   Avoid caffeine.     SEEK MEDICAL CARE IF:  You have light vaginal bleeding or spotting while pregnant.   You have abdominal pain or cramping.   You have a fever.     SEEK IMMEDIATE MEDICAL CARE IF:  You have heavy vaginal bleeding.   You have blood clots coming from your vagina.   You have severe low back pain or abdominal cramps.   You have fever, chills, and severe abdominal pain.     MAKE SURE YOU:  Understand these instructions.  Will watch your condition.  Will get help right away if you are not doing well or get worse.    ADDITIONAL NOTES AND INSTRUCTIONS    Please follow up with your Primary MD in 24-48 hr.  Seek immediate medical care for any new/worsening signs or symptoms.

## 2020-07-15 NOTE — ED ADULT NURSE NOTE - PMH
Anxiety    GERD (gastroesophageal reflux disease)    MRSA (methicillin resistant Staphylococcus aureus)

## 2020-07-15 NOTE — ED PROVIDER NOTE - OBJECTIVE STATEMENT
44 year old  F, currently 12 weeks pregnant LMP , presents with c/o vaginal bleeding since last night.  pt f/u with dr. durbin. Pt was seen in ED earlier today and had ama. Pt came back to ED due to heavier vaginal bleeding. Pt still refusing lab work.

## 2020-07-15 NOTE — ED ADULT NURSE NOTE - OBJECTIVE STATEMENT
Patient present to ED from home for complains vaginal bleeding for a couple of days, was recently seen in ED for the same issue. Patient came back for increased vaginal bleeding and dizziness, denies nausea, vomiting, diarrhea, and abdomen pain

## 2020-07-15 NOTE — ED PROVIDER NOTE - ATTENDING CONTRIBUTION TO CARE
43 yo f, 12 weeks pregnant with vaginal bleeding, seen earlier and had US done, but pt eloped prior to results.  pt returned now because says she is still bleeding.  still refusing labs.  agreed to pelvic exam.  exam: nad, ncat, perrl, pelvic exam as per rafy santos imp: pt with first trimester bleed, pending US results

## 2020-07-15 NOTE — ED ADULT TRIAGE NOTE - CHIEF COMPLAINT QUOTE
Pt 12 wks pregnant, had vaginal bleeding, was just seen in ED and left AMA.  Pt returned to ED for increase in bleeding and passing of what looks to be a large clot.  Pt has photos.

## 2020-07-15 NOTE — ED PROVIDER NOTE - CLINICAL SUMMARY MEDICAL DECISION MAKING FREE TEXT BOX
Pt with first trimester bleeding, found to have hemorrhagic cyst, still considered threatened ab, pt to f/u with her gyn/mfm outpt

## 2020-07-15 NOTE — ED PROVIDER NOTE - CARE PROVIDER_API CALL
Brittney Sanchez  Obstetrics and Gynecology  [address] [place] [address]  Phone: [phone]  Fax: [phone]  Follow Up Time:

## 2020-07-15 NOTE — ED ADULT TRIAGE NOTE - CHIEF COMPLAINT QUOTE
Pt having heavy vaginal bleeding, bright red with clots, since last night with cramping, approximately 3 pads and hour. LMP 04/21 +pregnancy 3 months

## 2020-07-15 NOTE — ED PROVIDER NOTE - NS ED ROS FT
Constitutional: no fever, chills, no recent weight loss, change in appetite or malaise  Cardiac: No chest pain, SOB or edema.  Respiratory: No cough or respiratory distress  GI: No nausea, vomiting, diarrhea or abdominal pain.  : + vaginal bleeding  MS: no pain to back or extremities, no loss of ROM, no weakness  Neuro: No headache or weakness. No LOC.  Skin: No skin rash.  Endocrine: No history of thyroid disease or diabetes.  Except as documented in the HPI, all other systems are negative.

## 2020-07-15 NOTE — ED PROVIDER NOTE - PATIENT PORTAL LINK FT
You can access the FollowMyHealth Patient Portal offered by [facility] by registering at the following website: [email or web address]. By joining [facility]’s FollowMyHealth portal, you will also be able to view your health information using other applications (apps) compatible with our system.

## 2020-07-15 NOTE — ED PROVIDER NOTE - ATTENDING CONTRIBUTION TO CARE
43 yo f with pmh of gerd, anxiety, , currently 12 weeks pregnant LMP , presents with c/o vaginal bleeding since last night.  pt says used 4 pads.  denies clot.  mild abd cramping.  no flank pain, no fever, no chills.  pt says last month, had similar bleeding and was dx with subchorionic hemorrhage.  pt f/u with dr. durbin of the [facility].  pt refusing labs and exam, as adamant that she is only here for US.  pt says she has been made anemic by the repeat blood draws from her M.  exam: nad, ncat, perrl, eomi, mmm, imP: pt with first trimester bleeding, refused labs, only want US

## 2020-07-15 NOTE — ED PROVIDER NOTE - PROGRESS NOTE DETAILS
Pt is refusing all bloodwork. Pt states she has baseline anemia and feels that taking more blood is harmful to her baby. Pt is adamant about this belief and attempts to explain the importance of blood work (CBC, BHCG, T&S) were unsuccessful. Pt explained that low BP could be a sign of significant blood loss and that a CBC is necessary. Pt states her BP is always low. Pt is insisting in only having an ultrasound Made aware by RN that pt said she had to leave and then walked out of the ED before results of US were back

## 2020-07-15 NOTE — ED PROVIDER NOTE - OBJECTIVE STATEMENT
Pt is 12 weeks pregnant c/o vag bleeding with clots since last night. Pt has had prior US that showed IUP with FHR. Pt is  and is O(+) blood type.

## 2020-07-15 NOTE — ED PROVIDER NOTE - PHYSICAL EXAMINATION
CONSTITUTIONAL: Well-appearing; well-nourished; in no apparent distress.   NECK: Supple; non-tender; no cervical lymphadenopathy.   CARDIOVASCULAR: Normal S1, S2; no murmurs, rubs, or gallops.   RESPIRATORY: Normal chest excursion with respiration; breath sounds clear and equal bilaterally; no wheezes, rhonchi, or rales.  GI/: Normal bowel sounds; non-distended; non-tender; no palpable organomegaly.   Pelvic exam: chaperoned by PCA: no external abnl. + small amount of blood in vagina. Cervical os closed. No Adnexa ttp. No CMT  MS: No evidence of trauma or deformity. . Normal ROM in all four extremities; non-tender to palpation; distal pulses are normal.   SKIN: Normal for age and race; warm; dry; good turgor; no apparent lesions or exudate.   NEURO/PSYCH: A & O x 4; grossly unremarkable. mood and manner are appropriate.

## 2020-07-17 ENCOUNTER — APPOINTMENT (OUTPATIENT)
Dept: ANTEPARTUM | Facility: CLINIC | Age: 45
End: 2020-07-17

## 2020-08-02 ENCOUNTER — OUTPATIENT (OUTPATIENT)
Dept: OUTPATIENT SERVICES | Facility: HOSPITAL | Age: 45
LOS: 1 days | Discharge: HOME | End: 2020-08-02

## 2020-08-02 VITALS
RESPIRATION RATE: 18 BRPM | HEART RATE: 91 BPM | SYSTOLIC BLOOD PRESSURE: 127 MMHG | DIASTOLIC BLOOD PRESSURE: 60 MMHG | TEMPERATURE: 98 F

## 2020-08-02 VITALS — HEART RATE: 91 BPM | DIASTOLIC BLOOD PRESSURE: 60 MMHG | SYSTOLIC BLOOD PRESSURE: 127 MMHG

## 2020-08-02 DIAGNOSIS — Z98.890 OTHER SPECIFIED POSTPROCEDURAL STATES: Chronic | ICD-10-CM

## 2020-08-02 LAB
APPEARANCE UR: ABNORMAL
BACTERIA # UR AUTO: NEGATIVE — SIGNIFICANT CHANGE UP
BILIRUB UR-MCNC: NEGATIVE — SIGNIFICANT CHANGE UP
COLOR SPEC: ABNORMAL
DIFF PNL FLD: ABNORMAL
EPI CELLS # UR: 3 /HPF — SIGNIFICANT CHANGE UP (ref 0–5)
GLUCOSE UR QL: NEGATIVE — SIGNIFICANT CHANGE UP
HYALINE CASTS # UR AUTO: 3 /LPF — SIGNIFICANT CHANGE UP (ref 0–7)
KETONES UR-MCNC: SIGNIFICANT CHANGE UP
LEUKOCYTE ESTERASE UR-ACNC: NEGATIVE — SIGNIFICANT CHANGE UP
NITRITE UR-MCNC: NEGATIVE — SIGNIFICANT CHANGE UP
PH UR: 6.5 — SIGNIFICANT CHANGE UP (ref 5–8)
PROT UR-MCNC: ABNORMAL
RBC CASTS # UR COMP ASSIST: >720 /HPF — HIGH (ref 0–4)
SP GR SPEC: 1.03 — HIGH (ref 1.01–1.02)
UROBILINOGEN FLD QL: ABNORMAL
WBC UR QL: 10 /HPF — HIGH (ref 0–5)

## 2020-08-02 NOTE — OB PROVIDER TRIAGE NOTE - NS_OBGYNHISTORY_OBGYN_ALL_OB_FT
OBhx:  FT NSVDx1, 26 years ago, no complications  etopx1 with D&C, SAB no D&C    Gynhx: Hx of ovarian cysts, no intervention. Denies hx of fibroids. 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

## 2020-08-02 NOTE — OB PROVIDER TRIAGE NOTE - ADDITIONAL INSTRUCTIONS
-Counseled patient on possible placenta previa vs persistent bleeding from subchorionic hemorrhage. Pelvic rest advised, encouraged PO hydration.  -Counseled patient on bleeding precautions  -follow up in office this week  -O pos  -SAB precautions given.

## 2020-08-02 NOTE — OB PROVIDER TRIAGE NOTE - NSOBPROVIDERNOTE_OBGYN_ALL_OB_FT
45 yo  at 15w1d, hx of subchorionic hemorrhage with vaginal bleeding, possible placenta previa, currently clinically and hemodynamically stable.   -Counseled patient on possible placenta previa vs persistent bleeding from subchorionic hemorrhage. Pelvic rest advised, encouraged PO hydration.  -Counseled patient on bleeding precautions  -follow up in office this week  -O pos  -SAB precautions given.    Discussed with Dr. Bravo

## 2020-08-02 NOTE — OB PROVIDER TRIAGE NOTE - NSHPPHYSICALEXAM_GEN_ALL_CORE
T(F): 98.1 (02 Aug 2020 20:07), Max: 98.1 (02 Aug 2020 20:07)  HR: 91 (02 Aug 2020 20:08) (91 - 91)  BP: 127/60 (02 Aug 2020 20:08) (127/60 - 127/60)  RR: 18 (02 Aug 2020 20:07) (18 - 18)    Gen: NAD, AAOx3  CVS: RRR, normal S1, S2  Lungs: CTAB  Abd: soft, non tender, non distended, no R/G/R, no suprapubic or CVA tenderness  LE: no edema or tenderness  Pelvic: Speculum exam - 20 cc of old blood clots in the vagina, no active bleeding, os appears closed. No discharge or lesions noted.   Bedside TVUS: CL 4.88cm. Blood clots noted in the cervix and possibly covering cervical os, unclear if possible placenta previa  Bedside TAUS: +Gross fetal movement, MVP 3.5CM, +fh 153bpm, anterior placenta, possible previa vs blood clots overlying the cervical os.   SVE: deferred

## 2020-08-02 NOTE — OB RN TRIAGE NOTE - NS PRO PASSIVE SMOKE EXP
Before leaving, please make sure you have all your personal belongings such as glasses, purses, wallets, keys, cell phones, jewelry, jackets etc        POST OPERATIVE INSTRUCTIONS:     Tonsillectomy/Adenoidectomy - Adult   Tonsillectomy is the removal of the tonsils. It is a safe and effective surgical procedure that will provide you with lasting benefits. The suggestions below should help with a rapid recovery.   What to Eat:    avoid foods that are hot, spicy or rough and scratchy    drink four to six 8 oz. glasses of liquid daily (sports drink, water, non-citrus juices) to prevent dehydration    within one to two days, add cold and soothing foods (icees, ice-cream, popsicles). This is not only safe but helps control postoperative swelling and pain.    as you feel better, add soft, bland items that are easy to swallow (pudding, mashed potatoes, soups)   Activity:    We encourage you to get out of bed frequently and return to normal activity as soon as possible. There are no strict rules for activity after surgery other than to avoid contact sports or heavy exertion for about two weeks. Nearly all adults can return to work by 1 week from surgery   Ways to Lessen Discomfort:    encourage adequate food and liquid intake    make sure all antibiotics are taken as prescribed    stay positive, this surgery will help you and you will back to your old self in 2 weeks.   Acetaminophen (Tylenol) +/- hydrocodone can be used as directed. Avoid ibuprofen products (Motrin, Advil) because they may increase the chance of bleeding, if taken routinely. It can be used sparingly to help severe pain control.   What to Expect:    hoarse or abnormal voice may occur for several days    increased pain between 5-10 days as scabs mature and fall off    a white scab or crust will form in the throat and will go away within about two weeks    ear pain may occur (the ears and tonsils share common nerves), but is temporary and  requires no treatment    fever up to 101 degrees    bad breath is common for several days    vomiting may occur for up to 24 hours   Call the Doctor if You:    have persistent or excessive bleeding (go right to the nearest emergency room if severe)    have inadequate food or beverage intake    have fever 102 degrees or higher despite acetaminophen (Tylenol)    develop a severe stiff neck    seem to be getting worse or is not getting better as the days go by   For Questions or Emergency Care:   Call the office at [phone]. You may need to speak to the doctor on call.    UPPP is the removal of th rmaining tonsils and portions of the back of the soft palate.  It is a safe and effective surgical procedure that will provide you with lasting benefits.  Suggestions below should help with a rapid recovery.    What to eat:    Avoid foods that are hot, spicy or rough and scratchy  To prevent dehydration dring 4-6 cups of liquid daily- no citrus juice  Within 1-2 days add cold and soothing foods- ices, ice cream etc  As you feel better add soft bland items that are easy to chew and swallow    Activity:    Get out of bed frequently and return to normal activity as soon as possible.  No strict rules for activity other than avoid contact sports or heavy exertion for about 2 weeks.  Nearly all adults can return to work by 1 week after surgery    Ways to lessen Pain:  Encourage adequate food and liquids  Take all antibiotics as prescribed  Stay positive, you will return to your old self in 7-10 days  Pain med as prescribed, Tylenol and Ibuprofen can be used also but be cautious about Tylenol Content- Pain Rx has Tylenol in it      What to expect:  Hoarse or abnormal voice may occur for several days  Vomitting may occur for up to 24 hrs  Snoring may persists for 1-2 weeks  Ear pain may occur  Fever up to 101 may occur  Bad breath is common for several days  A scab or crust will form in the throat and will go away  withinin about 2 weeks    Call the Dr If:   You have persistent or excessive bleeding- go to ER if severe  You have inadequate food or liquid intake  You have fever 102 or higher despite Tylenol or Advil  You develop a stiff neck  You seem to be getting worse instead of better as the days go by   Yes...

## 2020-08-02 NOTE — OB PROVIDER TRIAGE NOTE - HISTORY OF PRESENT ILLNESS
43 yo  at 15w1d with CLAUDE  by first trimester sono done in ED 7/15 (pt reports receiving different CLAUDE in office however is unsure of her last LMP), presents complaining of vaginal bleeding. Pt reports hx of subchorionic hemorrhage earlier this pregnancy with spotting for 2 weeks, which then resolved. Reports hx of ovarian cyst rupture 2 weeks ago, with subsequent spotting. Has had intermittent mild abdominal pain throughout the last month which has now resolved. Pt presented today as she noticed more blood in the toilet after she masturbated. Denies other complications this pregnancy. Denies vaginal discharge or abdominal pain. Denies fevers, chills, headaches, vision changes, CP, SOB, N/V/D, dysuria, or increased swelling. Last PO at 1600, last BM this morning, normal. Last intercourse 3 months ago.   Last visit with Dr. Bravo 2 weeks ago, missed appointment last week.

## 2020-08-03 LAB
CULTURE RESULTS: SIGNIFICANT CHANGE UP
SPECIMEN SOURCE: SIGNIFICANT CHANGE UP

## 2020-09-11 ENCOUNTER — APPOINTMENT (OUTPATIENT)
Dept: ANTEPARTUM | Facility: CLINIC | Age: 45
End: 2020-09-11

## 2020-10-13 ENCOUNTER — INPATIENT (INPATIENT)
Facility: HOSPITAL | Age: 45
LOS: 6 days | Discharge: HOME | End: 2020-10-20
Attending: OBSTETRICS & GYNECOLOGY | Admitting: OBSTETRICS & GYNECOLOGY
Payer: MEDICAID

## 2020-10-13 VITALS — DIASTOLIC BLOOD PRESSURE: 67 MMHG | HEART RATE: 75 BPM | SYSTOLIC BLOOD PRESSURE: 116 MMHG

## 2020-10-13 DIAGNOSIS — K21.9 GASTRO-ESOPHAGEAL REFLUX DISEASE WITHOUT ESOPHAGITIS: ICD-10-CM

## 2020-10-13 DIAGNOSIS — O40.2XX0 POLYHYDRAMNIOS, SECOND TRIMESTER, NOT APPLICABLE OR UNSPECIFIED: ICD-10-CM

## 2020-10-13 DIAGNOSIS — Z91.19 PATIENT'S NONCOMPLIANCE WITH OTHER MEDICAL TREATMENT AND REGIMEN: ICD-10-CM

## 2020-10-13 DIAGNOSIS — O32.8XX0 MATERNAL CARE FOR OTHER MALPRESENTATION OF FETUS, NOT APPLICABLE OR UNSPECIFIED: ICD-10-CM

## 2020-10-13 DIAGNOSIS — Z98.890 OTHER SPECIFIED POSTPROCEDURAL STATES: Chronic | ICD-10-CM

## 2020-10-13 DIAGNOSIS — Z88.0 ALLERGY STATUS TO PENICILLIN: ICD-10-CM

## 2020-10-13 DIAGNOSIS — Z87.891 PERSONAL HISTORY OF NICOTINE DEPENDENCE: ICD-10-CM

## 2020-10-13 DIAGNOSIS — Z3A.24 24 WEEKS GESTATION OF PREGNANCY: ICD-10-CM

## 2020-10-13 DIAGNOSIS — O45.92 PREMATURE SEPARATION OF PLACENTA, UNSPECIFIED, SECOND TRIMESTER: ICD-10-CM

## 2020-10-13 DIAGNOSIS — O42.912 PRETERM PREMATURE RUPTURE OF MEMBRANES, UNSPECIFIED AS TO LENGTH OF TIME BETWEEN RUPTURE AND ONSET OF LABOR, SECOND TRIMESTER: ICD-10-CM

## 2020-10-14 LAB
AMPHET UR-MCNC: NEGATIVE — SIGNIFICANT CHANGE UP
APPEARANCE UR: CLEAR — SIGNIFICANT CHANGE UP
BACTERIA # UR AUTO: NEGATIVE — SIGNIFICANT CHANGE UP
BARBITURATES UR SCN-MCNC: NEGATIVE — SIGNIFICANT CHANGE UP
BASOPHILS # BLD AUTO: 0.05 K/UL — SIGNIFICANT CHANGE UP (ref 0–0.2)
BASOPHILS NFR BLD AUTO: 0.3 % — SIGNIFICANT CHANGE UP (ref 0–1)
BENZODIAZ UR-MCNC: NEGATIVE — SIGNIFICANT CHANGE UP
BILIRUB UR-MCNC: NEGATIVE — SIGNIFICANT CHANGE UP
BLD GP AB SCN SERPL QL: SIGNIFICANT CHANGE UP
BUPRENORPHINE SCREEN, URINE RESULT: NEGATIVE — SIGNIFICANT CHANGE UP
COCAINE METAB.OTHER UR-MCNC: NEGATIVE — SIGNIFICANT CHANGE UP
COLOR SPEC: YELLOW — SIGNIFICANT CHANGE UP
DIFF PNL FLD: NEGATIVE — SIGNIFICANT CHANGE UP
EOSINOPHIL # BLD AUTO: 0.37 K/UL — SIGNIFICANT CHANGE UP (ref 0–0.7)
EOSINOPHIL NFR BLD AUTO: 2.3 % — SIGNIFICANT CHANGE UP (ref 0–8)
EPI CELLS # UR: 4 /HPF — SIGNIFICANT CHANGE UP (ref 0–5)
FENTANYL UR QL: NEGATIVE — SIGNIFICANT CHANGE UP
GLUCOSE UR QL: NEGATIVE — SIGNIFICANT CHANGE UP
HBV SURFACE AG SERPL QL IA: SIGNIFICANT CHANGE UP
HCT VFR BLD CALC: 31.5 % — LOW (ref 37–47)
HGB BLD-MCNC: 10 G/DL — LOW (ref 12–16)
HIV 1+2 AB+HIV1 P24 AG SERPL QL IA: SIGNIFICANT CHANGE UP
HYALINE CASTS # UR AUTO: 5 /LPF — SIGNIFICANT CHANGE UP (ref 0–7)
IMM GRANULOCYTES NFR BLD AUTO: 1.1 % — HIGH (ref 0.1–0.3)
KETONES UR-MCNC: SIGNIFICANT CHANGE UP
L&D DRUG SCREEN, URINE: SIGNIFICANT CHANGE UP
LEUKOCYTE ESTERASE UR-ACNC: ABNORMAL
LYMPHOCYTES # BLD AUTO: 15.1 % — LOW (ref 20.5–51.1)
LYMPHOCYTES # BLD AUTO: 2.43 K/UL — SIGNIFICANT CHANGE UP (ref 1.2–3.4)
MCHC RBC-ENTMCNC: 28.2 PG — SIGNIFICANT CHANGE UP (ref 27–31)
MCHC RBC-ENTMCNC: 31.7 G/DL — LOW (ref 32–37)
MCV RBC AUTO: 88.7 FL — SIGNIFICANT CHANGE UP (ref 81–99)
METHADONE UR-MCNC: NEGATIVE — SIGNIFICANT CHANGE UP
MONOCYTES # BLD AUTO: 0.97 K/UL — HIGH (ref 0.1–0.6)
MONOCYTES NFR BLD AUTO: 6 % — SIGNIFICANT CHANGE UP (ref 1.7–9.3)
NEUTROPHILS # BLD AUTO: 12.04 K/UL — HIGH (ref 1.4–6.5)
NEUTROPHILS NFR BLD AUTO: 75.2 % — SIGNIFICANT CHANGE UP (ref 42.2–75.2)
NITRITE UR-MCNC: NEGATIVE — SIGNIFICANT CHANGE UP
NRBC # BLD: 0 /100 WBCS — SIGNIFICANT CHANGE UP (ref 0–0)
OPIATES UR-MCNC: NEGATIVE — SIGNIFICANT CHANGE UP
OXYCODONE UR-MCNC: NEGATIVE — SIGNIFICANT CHANGE UP
PCP UR-MCNC: NEGATIVE — SIGNIFICANT CHANGE UP
PH UR: 7 — SIGNIFICANT CHANGE UP (ref 5–8)
PLATELET # BLD AUTO: 356 K/UL — SIGNIFICANT CHANGE UP (ref 130–400)
PRENATAL SYPHILIS TEST: SIGNIFICANT CHANGE UP
PROPOXYPHENE QUALITATIVE URINE RESULT: NEGATIVE — SIGNIFICANT CHANGE UP
PROT UR-MCNC: SIGNIFICANT CHANGE UP
RBC # BLD: 3.55 M/UL — LOW (ref 4.2–5.4)
RBC # FLD: 13.5 % — SIGNIFICANT CHANGE UP (ref 11.5–14.5)
RBC CASTS # UR COMP ASSIST: 3 /HPF — SIGNIFICANT CHANGE UP (ref 0–4)
RUBV IGG SER-ACNC: 1.3 INDEX — SIGNIFICANT CHANGE UP
RUBV IGG SER-IMP: POSITIVE — SIGNIFICANT CHANGE UP
SARS-COV-2 RNA SPEC QL NAA+PROBE: SIGNIFICANT CHANGE UP
SP GR SPEC: 1.01 — SIGNIFICANT CHANGE UP (ref 1.01–1.03)
UROBILINOGEN FLD QL: SIGNIFICANT CHANGE UP
WBC # BLD: 16.04 K/UL — HIGH (ref 4.8–10.8)
WBC # FLD AUTO: 16.04 K/UL — HIGH (ref 4.8–10.8)
WBC UR QL: 10 /HPF — HIGH (ref 0–5)

## 2020-10-14 PROCEDURE — 99223 1ST HOSP IP/OBS HIGH 75: CPT

## 2020-10-14 PROCEDURE — 76818 FETAL BIOPHYS PROFILE W/NST: CPT | Mod: 26

## 2020-10-14 RX ORDER — CEFAZOLIN SODIUM 1 G
1000 VIAL (EA) INJECTION EVERY 8 HOURS
Refills: 0 | Status: DISCONTINUED | OUTPATIENT
Start: 2020-10-14 | End: 2020-10-15

## 2020-10-14 RX ORDER — MAGNESIUM SULFATE 500 MG/ML
2 VIAL (ML) INJECTION
Qty: 40 | Refills: 0 | Status: DISCONTINUED | OUTPATIENT
Start: 2020-10-14 | End: 2020-10-14

## 2020-10-14 RX ORDER — AZITHROMYCIN 500 MG/1
500 TABLET, FILM COATED ORAL EVERY 24 HOURS
Refills: 0 | Status: DISCONTINUED | OUTPATIENT
Start: 2020-10-14 | End: 2020-10-15

## 2020-10-14 RX ORDER — ACETAMINOPHEN 500 MG
1000 TABLET ORAL ONCE
Refills: 0 | Status: COMPLETED | OUTPATIENT
Start: 2020-10-15 | End: 2020-10-15

## 2020-10-14 RX ORDER — SODIUM CHLORIDE 9 MG/ML
1000 INJECTION, SOLUTION INTRAVENOUS
Refills: 0 | Status: DISCONTINUED | OUTPATIENT
Start: 2020-10-14 | End: 2020-10-15

## 2020-10-14 RX ORDER — MAGNESIUM SULFATE 500 MG/ML
4 VIAL (ML) INJECTION ONCE
Refills: 0 | Status: DISCONTINUED | OUTPATIENT
Start: 2020-10-14 | End: 2020-10-14

## 2020-10-14 RX ORDER — ACETAMINOPHEN 500 MG
1000 TABLET ORAL ONCE
Refills: 0 | Status: COMPLETED | OUTPATIENT
Start: 2020-10-14 | End: 2020-10-14

## 2020-10-14 RX ORDER — SODIUM CHLORIDE 9 MG/ML
1000 INJECTION, SOLUTION INTRAVENOUS
Refills: 0 | Status: DISCONTINUED | OUTPATIENT
Start: 2020-10-14 | End: 2020-10-14

## 2020-10-14 RX ORDER — CEFAZOLIN SODIUM 1 G
2000 VIAL (EA) INJECTION ONCE
Refills: 0 | Status: COMPLETED | OUTPATIENT
Start: 2020-10-14 | End: 2020-10-14

## 2020-10-14 RX ORDER — INFLUENZA VIRUS VACCINE 15; 15; 15; 15 UG/.5ML; UG/.5ML; UG/.5ML; UG/.5ML
0.5 SUSPENSION INTRAMUSCULAR ONCE
Refills: 0 | Status: COMPLETED | OUTPATIENT
Start: 2020-10-14 | End: 2020-10-14

## 2020-10-14 RX ORDER — MAGNESIUM SULFATE 500 MG/ML
4 VIAL (ML) INJECTION ONCE
Refills: 0 | Status: COMPLETED | OUTPATIENT
Start: 2020-10-14 | End: 2020-10-14

## 2020-10-14 RX ORDER — INDOMETHACIN 50 MG
50 CAPSULE ORAL ONCE
Refills: 0 | Status: COMPLETED | OUTPATIENT
Start: 2020-10-14 | End: 2020-10-14

## 2020-10-14 RX ORDER — CEFAZOLIN SODIUM 1 G
VIAL (EA) INJECTION
Refills: 0 | Status: DISCONTINUED | OUTPATIENT
Start: 2020-10-14 | End: 2020-10-15

## 2020-10-14 RX ORDER — MAGNESIUM SULFATE 500 MG/ML
2 VIAL (ML) INJECTION
Qty: 40 | Refills: 0 | Status: DISCONTINUED | OUTPATIENT
Start: 2020-10-14 | End: 2020-10-15

## 2020-10-14 RX ADMIN — AZITHROMYCIN 255 MILLIGRAM(S): 500 TABLET, FILM COATED ORAL at 12:25

## 2020-10-14 RX ADMIN — Medication 400 MILLIGRAM(S): at 16:00

## 2020-10-14 RX ADMIN — Medication 12 MILLIGRAM(S): at 01:24

## 2020-10-14 RX ADMIN — Medication 50 GM/HR: at 05:10

## 2020-10-14 RX ADMIN — Medication 100 MILLIGRAM(S): at 01:24

## 2020-10-14 RX ADMIN — Medication 200 GRAM(S): at 04:40

## 2020-10-14 RX ADMIN — Medication 100 MILLIGRAM(S): at 21:56

## 2020-10-14 RX ADMIN — Medication 100 MILLIGRAM(S): at 17:55

## 2020-10-14 RX ADMIN — Medication 100 MILLIGRAM(S): at 09:23

## 2020-10-14 RX ADMIN — Medication 1000 MILLIGRAM(S): at 16:30

## 2020-10-14 NOTE — PROGRESS NOTE ADULT - ASSESSMENT
A/P: 44yo  at 24w5d, GBS unknown, AMA, elevated Trisomy 21 risk on Seq 1 with low risk NIPT, with PPROM on 10/12 in the AM, s/p celestone dose #1, on latency antibiotics, on magnesium for neuroprotection.     -Cont efm/toco  -Continue with magnesium until 24hrs after 2nd dose of celestone  -Neuro checks [id number]  -seizure precautions  -clear liquid diet, IVF  -celestone dose #2 10/15 @0130  -ancef and azithromycin for latency antibiotics  -pain management with IV tylenol prn  -s/p NICU consult, reccs appreciated  -SCDs  -PNV  -f/u Ucx, GBS, vaginitis panel  -needs SW consult postpartum    Dr. Acharya and Dr. Bravo aware.   A/P: 46yo  at 24w5d, GBS unknown, AMA, elevated Trisomy 21 risk on Seq 1 with low risk NIPT, with PPROM on 10/12 in the AM, s/p celestone dose #1, on latency antibiotics, on magnesium for neuroprotection.     -Cont efm/toco  -Continue with magnesium until 24hrs after 2nd dose of celestone  -Neuro checks [id number]  -seizure precautions  -clear liquid diet, IVF  -celestone dose #2 10/15 @0130  -strict I/Os, walker in place  -ancef and azithromycin for latency antibiotics  -pain management with IV tylenol prn  -s/p NICU consult, reccs appreciated  -SCDs  -PNV  -f/u Ucx, GBS, vaginitis panel  -needs SW consult postpartum    Dr. Acharya and Dr. Bravo aware.

## 2020-10-14 NOTE — OB PROVIDER H&P - PMH
Anxiety    GERD (gastroesophageal reflux disease)    MRSA (methicillin resistant Staphylococcus aureus)     Anxiety    GERD (gastroesophageal reflux disease)

## 2020-10-14 NOTE — PROGRESS NOTE ADULT - SUBJECTIVE AND OBJECTIVE BOX
PGY3 Mag Note    Subjective:   Pt evaluated at bedside for magnesium check. She reports feeling vaginal pressure when she changes positions. She denies visual disturbances, headache and right upper quadrant pain. Also denies nausea/vomiting/chest pain/epigastric pain/shortness of breath.     Objective:   Vital signs: T(F): 98.24 (10-14-20 @ 08:10), Max: 98.24 (10-14-20 @ 08:10)  HR: 95 (10-14-20 @ 15:29) (76 - 108)  BP: 96/53 (10-14-20 @ 15:22) (89/52 - 124/60)  RR: 18 (10-14-20 @ 07:00) (18 - 18)  SpO2: 100% (10-14-20 @ 15:29) (89% - 100%)  BP:  (89/52 - 124/60)    UO 300cc/h 5530-2807    Gen: NAD, AAOx3  CV: S1S2, RRR, no M/R/G  Pulm: CTAB, no rhonchi or rales or wheezing  Abd: soft, gravid, nontender, no rebound or guarding, no epigastric tenderness  : Avila   Ext: SCDs in place  Neuro: +2 UE reflexes b/l        Labs:                        10.0   16.04 )-----------( 356      ( 14 Oct 2020 00:41 )             31.5   GBS pending  Uctx pending

## 2020-10-14 NOTE — CONSULT NOTE ADULT - ATTENDING COMMENTS
[facility] Staff    I saw and evaluated Ms. MARLON LAGUERRE and I agree with the documentation above.  Ms. MARLON LAGUERRE reports positive fetal movement and no vaginal bleeding.  She is complaining of pelvic pressure.  She has occasional leakage of fluid, non foul smelling.    General:  Ms. MARLON LAGUERRE is lying in bed.  She appears overall comfortable.    Vital Signs Last 24 Hrs  T(C): 36.8 (14 Oct 2020 08:10), Max: 36.8 (13 Oct 2020 23:47)  T(F): 98.24 (14 Oct 2020 08:10), Max: 98.24 (14 Oct 2020 08:10)  HR: 99 (14 Oct 2020 14:22) (75 - 108)  BP: 97/53 (14 Oct 2020 14:22) (89/52 - 144/62)  BP(mean): --  RR: 18 (14 Oct 2020 07:00) (18 - 18)  SpO2: 99% (14 Oct 2020 13:46) (89% - 100%)    Cardiovascular:  Normal S1 S2.  No murmurs.  Pulmonary:  Clear to auscultation bilaterally.  No wheezing.  Abdomen:  Soft, nontender, nondistended, no rebound, no guarding.  No fundal tenderness.  Extremities:  Nontender calves.  Neurology:  Deep tendon reflexes 2+ bilaterally.  Psychiatry:  Sad.  Awake, alert, oriented to person, place, time.  10/14 Speculum @0035 (resident exam): grossly ruptured with fetal head noted at cervix, cervix approx 4cm dilated  SVE @0101 5/100/-2                        10.0   16.04 )-----------( 356      ( 14 Oct 2020 00:41 )             31.5    Ms. Laguerre is a 44yo  @24w5d with  labor and  premature rupture of membranes.    We discussed that  labor can be associated with an increased risk of  morbidity and/or mortality.  These risks increase as the gestational age decreases.  The risks include but are not limited to an increased risk of cerebral palsy, interventricular hemorrhage (bleeding in the brain), retinopathy of prematurity, respiratory distress, necrotizing enterocolitis.  The neonatology team can provide more complete details.    We recommend:    1.   steroids for fetal maturity  2.  Observation for contractions with tocolysis, if necessary through steroid window (24 hours after the second dose of steroids).  3.  Antibiotics for GBS prophylaxis until there are negative GBS culture results  4.  Magnesium sulfate for neuroprotection (not tocolysis in the context of rupture of membranes)  5.  NICU consult  6.  If the patient delivers  and becomes pregnant again she should see a high  as soon as possible to discuss interventions to reduce the risk of  delivery.    We discussed the increased risks of chorioamnionitis in the context of PPROM.  We will closely monitor her vital signs, temperature and WBC (which will initially be elevated due to  steroids), and well as her physical exam, especially fundal tenderness.  If there are concerns for chorioamnionitis we will most likely proceed towards delivery.  Under most circumstances we deliver at around 34 weeks gestation in the absence of chorioamnionitis.   We discussed that around 50% of patients with PPROM will deliver within seven days.    In addition there are increased risks of placental abruption, as well as the baby being bruised in utero due to reduction of the amniotic fluid.  There is also a possibility of cord prolapse with usually results in an emergency  delivery.    In addition to the above, I recommend:    1.  Latency antibiotics    At the present time my suspicion for chorioamnionitis is low.    Continue to monitor on labor and delivery.    Ms. Laguerre expressed understanding and all of her questions were answered to her satisfaction.  Thank you for this consult.  Discussed with Dr. Wilkerson.    Vinod Holland MD

## 2020-10-14 NOTE — CONSULT NOTE ADULT - ASSESSMENT
46yo  @24w5d, GBS unknown, AMA, elevated Trisomy 21 risk on Seq 1 with low risk NIPT, mild polyhydramnios, consult by Dr. Bravo for  labor at 24w.     1.  labor, ROM since 10/13 approx. 1500   10/14 Celestone #1 @0100, celestone #2 ordered  -ancef 2g @0100 for GBS ppx  -magnesium started @0440 for neuroprotection   -indomethacin for tocolysis: patient combative and difficult to reason with. She refused PO indomethacin, discussed risks of not taking indomethacin including continued contractions and  labor, patient understands and continues to refuse.  -pain management prn  -s/p NICU consult, reccs appreciated    2. Pregnancy  -SCDs  -PNV  - f/u HbsAg, HIV, rubella, UDS, Ucx (straight cath), GBS, vaginitis panel  [ ] Social Work postpartum for noncompliance     Dr. Holland to be aware

## 2020-10-14 NOTE — PROGRESS NOTE ADULT - SUBJECTIVE AND OBJECTIVE BOX
Patient presented to  c/o pelvic pain   on examination found to be grossly ruptured  Patients prenatal care complicated by AMA abn nt screen   NIPT normal female  also had first trimester bleeding  Patient non compliant in office and labs  last ob visit 8/27/20  refused to schedule any further appointments  Did not go to level 2   was contacted as recently as 10/5/2020 and refused to come to appointment  Patient 5 cm 100% vx -2  contractions seem irregular  betamethasone given  refused indomethacin  antibiotics for gbs unknown prophylaxis started  NICU consult  Patient not amenable to discussing management  as baby vertex plan vaginal delivery unless any signs of distress

## 2020-10-14 NOTE — PROGRESS NOTE ADULT - ASSESSMENT
46yo  @24w5d, GBS unknown, AMA, elevated Trisomy 21 risk on Seq 1 with low risk NIPT, mild polyhydramnios, ruptured in labor on magnesium for   -admit to L&D  -cont efm/toco  -clear liquid diet, IVF  -celestone course for fetal lung maturity  -ancef for gbs prophylaxis  -indomethacin for tocolysis: patient combative and difficult to reason with. She refused PO indomethacin, discussed risks of not taking indomethacin including continued contractions and  labor, patient understands and continues to refuse. Will monitor for now, if patient amenable, will start Mg for neuroprotection and indomethacin for tocolysis at later time.    -pain management prn  -s/p NICU consult, reccs appreciated  -MFM consult in AM   -SCDs  -PNV  -f/u COVID swab, admission labs, prenatal labs  -f/u Ucx, GBS, vaginitis panel  - needs SW consult postpartum    Dr. Hernandez and Dr. Acharya aware.   A/P: 45y GXPXs at XXGA with preeclampsia with severe features on magnesium for seizure prophylaxis  - continue magnesium until XXXX  - next labs and magnesium level at XXXX  - f/u BPs; Current BPs XXXX  - stricts I/Os  - continuous EFM and toco  - Seizure precautions  - Reevaluate in 2hours     Case discussed with chief resident, and Dr. PHELPS aware. 46yo  @24w5d, GBS unknown, AMA, elevated Trisomy 21 risk on Seq 1 with low risk NIPT, mild polyhydramnios, ruptured on magnesium for neuroprophylaxis    -cont efm/toco  -clear liquid diet, IVF  -celestone course for fetal lung maturity  -ancef for gbs prophylaxis  -pain management prn  -s/p NICU consult, reccs appreciated  -MFM consult in AM   -SCDs  -PNV  -f/u Ucx, GBS, vaginitis panel  - needs SW consult postpartum    Dr. Biswas and Dr. Coates to be made aware

## 2020-10-14 NOTE — PROGRESS NOTE ADULT - SUBJECTIVE AND OBJECTIVE BOX
Bedside ultrasound    Breech anterior placenta deepest vertical pocket 5 cm biophysical profile 10/10    [facility] procedure note    Non stress test     labor    Date:  10/14/2020  Start:  08:16  End:  09:45    Baseline:   135 beats per minute  Variability:  moderate  Accelerations: present  Decelerations:  absent    Tocometer:  every 2 minutes --> irregular    Vinod Holland MD

## 2020-10-14 NOTE — OB PROVIDER H&P - HISTORY OF PRESENT ILLNESS
44yo  @24w5d with CLAUDE  by first trimester sonogram per patient presenting to L&D for pelvic pressure and clear leakage of fluid since yesterday AM.  Pressure began as irregular and is now q3-4m.  It is nonradiating, associated with rectal pressure, did not attempt pain relief with any OTC medication. No recent abdominal trauma or intercourse. Also reports 2 episodes of clear vaginal leaking since yesterday.  Denies vaginal bleeding.  Good fetal movement.  Otherwise denies fevers, chills, dysuria, hematuria.  Last saw her provider in August, has poor prenatal follow-up.  She had 2 weeks of 1st trimester bleeding which resolved spontaneously.  Sequential 1 with evidence of increased risk of Down Syndrome (1:109).  NIPT with low risk female. Denies any other complications with this pregnancy.  Denies HA, CP, SOB, N/V, diarrhea, constipation, abdominal pain.  Last PO intake @1800, last BM this AM, last intercourse 6 months ago. 46yo  @24w5d with CLAUDE  by first trimester sonogram per patient presenting to L&D for pelvic pressure and clear leakage of fluid since 10/12 AM.  Pressure began as irregular and is now q3-4m.  It is nonradiating, associated with rectal pressure, did not attempt pain relief with any OTC medication. No recent abdominal trauma or intercourse. Also reports 2 episodes of clear vaginal leaking since yesterday.  Denies vaginal bleeding.  Good fetal movement.  Otherwise denies fevers, chills, dysuria, hematuria.  Last saw her provider in August, has poor prenatal follow-up.  She had 2 weeks of 1st trimester bleeding which resolved spontaneously.  Sequential 1 with evidence of increased risk of Down Syndrome (1:109).  NIPT with low risk female. Denies any other complications with this pregnancy.  Denies HA, CP, SOB, N/V, diarrhea, constipation, abdominal pain.  Last PO intake @1800, last BM this AM, last intercourse 6 months ago. 46yo  @24w5d with CLAUDE  by first trimester sonogram presenting to L&D for pelvic pressure and clear leakage of fluid since 10/12 AM (unsure of time).  Pressure began as irregular and is now q3-4m.  It is nonradiating, associated with rectal pressure, did not attempt pain relief with any OTC medication. No recent abdominal trauma or intercourse. Also reports 2 episodes of clear vaginal leaking since yesterday.  Denies vaginal bleeding.  Good fetal movement.  Otherwise denies fevers, chills, dysuria, hematuria.  Last saw her provider in August, has poor prenatal follow-up.  She had 2 weeks of 1st trimester bleeding which resolved spontaneously.  Sequential 1 with evidence of increased risk of Down Syndrome (1:109).  NIPT with low risk female. Denies any other complications with this pregnancy.  Denies HA, CP, SOB, N/V, diarrhea, constipation, abdominal pain.  Last PO intake @1800, last BM this AM, last intercourse 6 months ago.

## 2020-10-14 NOTE — OB PROVIDER H&P - ASSESSMENT
46yo  @24w5d, GBS unknown, Rh pos, AMA, elevated Trisomy 21 risk on Seq 1 with low risk NIPT, polyhydramnios with MVP 8.28cm, ruptured in labor  -admit to L&D  -cont efm/toco  -NPO, IVF  -celestone course for fetal lung maturity  -ancef for gbs prophylaxis  -indomethacin for tocolysis: patient refused PO indomethacin, discussed risks of not taking indomethacin including continued contractions and  labor, patient understands and continues to refuse.  -pain management prn  -NICU consult, reccs appreciated  -f/u COVID swab, admission labs, prenatal labs  -f/u Ucx, GBS, vaginitis panel    Dr. Hernandez and Dr. Acharya aware. 44yo  @24w5d, GBS unknown, Rh pos, AMA, elevated Trisomy 21 risk on Seq 1 with low risk NIPT, polyhydramnios with MVP 8.28cm, ruptured in labor  -admit to L&D  -cont efm/toco  -clear liquid diet, IVF  -celestone course for fetal lung maturity  -ancef for gbs prophylaxis  -indomethacin for tocolysis: patient refused PO indomethacin, discussed risks of not taking indomethacin including continued contractions and  labor, patient understands and continues to refuse.  -pain management prn  -NICU consult, reccs appreciated  -SCDs  -PNV  -f/u COVID swab, admission labs, prenatal labs  -f/u Ucx, GBS, vaginitis panel    Dr. Hernandez and Dr. Acharya aware. 46yo  @24w5d, GBS unknown, AMA, elevated Trisomy 21 risk on Seq 1 with low risk NIPT, mild polyhydramnios, ruptured in labor  -admit to L&D  -cont efm/toco  -clear liquid diet, IVF  -celestone course for fetal lung maturity  -ancef for gbs prophylaxis  -indomethacin for tocolysis: patient refused PO indomethacin, discussed risks of not taking indomethacin including continued contractions and  labor, patient understands and continues to refuse.  -pain management prn  -s/p NICU consult, reccs appreciated  -MFM consult in AM   -SCDs  -PNV  -f/u COVID swab, admission labs, prenatal labs  -f/u Ucx, GBS, vaginitis panel    Dr. Hernandez and Dr. Acharya aware. 44yo  @24w5d, GBS unknown, AMA, elevated Trisomy 21 risk on Seq 1 with low risk NIPT, mild polyhydramnios, ruptured in labor  -admit to L&D  -cont efm/toco  -clear liquid diet, IVF  -celestone course for fetal lung maturity  -ancef for gbs prophylaxis  -indomethacin for tocolysis: patient refused PO indomethacin, discussed risks of not taking indomethacin including continued contractions and  labor, patient understands and continues to refuse.  -pain management prn  -s/p NICU consult, reccs appreciated  -MFM consult in AM   -SCDs  -PNV  -f/u COVID swab, admission labs, prenatal labs  -f/u Ucx, GBS, vaginitis panel  - needs SW consult postpartum    Dr. Hernandez and Dr. Acharya aware. 46yo  @24w5d, GBS unknown, AMA, elevated Trisomy 21 risk on Seq 1 with low risk NIPT, mild polyhydramnios, ruptured in labor  -admit to L&D  -cont efm/toco  -clear liquid diet, IVF  -celestone course for fetal lung maturity  -ancef for gbs prophylaxis  -indomethacin for tocolysis: patient combative and difficult to reason with. She refused PO indomethacin, discussed risks of not taking indomethacin including continued contractions and  labor, patient understands and continues to refuse. Will monitor for now, if patient amenable, will start Mg for neuroprotection and indomethacin for tocolysis at later time.    -pain management prn  -s/p NICU consult, reccs appreciated  -MFM consult in AM   -SCDs  -PNV  -f/u COVID swab, admission labs, prenatal labs  -f/u Ucx, GBS, vaginitis panel  - needs SW consult postpartum    Dr. Hernandez and Dr. Acharya aware.

## 2020-10-14 NOTE — OB PROVIDER H&P - NSHPPHYSICALEXAM_GEN_ALL_CORE
Vital Signs Last 24 Hrs  T(C): 36.8 (13 Oct 2020 23:47), Max: 36.8 (13 Oct 2020 23:47)  T(F): 98.2 (13 Oct 2020 23:47), Max: 98.2 (13 Oct 2020 23:47)  HR: 91 (14 Oct 2020 01:43) (75 - 91)  BP: 114/61 (14 Oct 2020 01:43) (114/61 - 116/67)  RR: 18 (13 Oct 2020 23:47) (18 - 18)    Gen: NAD, sitting comfortably  Abd: Gravid, soft, NT, strongly palpable ctx  SVE: 5/100/-1, vtx, grossly ruptured  EFM: 135/mod/+accels  [place]: q3-4m  Sono: cephalic, ant placenta, BPP 8/8, MVP 8.28cm, EFW 822cm (77%) Vital Signs Last 24 Hrs  T(C): 36.8 (13 Oct 2020 23:47), Max: 36.8 (13 Oct 2020 23:47)  T(F): 98.2 (13 Oct 2020 23:47), Max: 98.2 (13 Oct 2020 23:47)  HR: 91 (14 Oct 2020 01:43) (75 - 91)  BP: 114/61 (14 Oct 2020 01:43) (114/61 - 116/67)  RR: 18 (13 Oct 2020 23:47) (18 - 18)    Gen: NAD, sitting comfortably  Abd: Gravid, soft, NT, strongly palpable ctx  SVE: 5/100/-2, vtx, grossly ruptured  EFM: 135/mod/+accels  [place]: q3-4m  Sono: cephalic, ant placenta, BPP 8/8, MVP 8.28cm, EFW 822cm (77%) Vital Signs Last 24 Hrs  T(C): 36.8 (13 Oct 2020 23:47), Max: 36.8 (13 Oct 2020 23:47)  T(F): 98.2 (13 Oct 2020 23:47), Max: 98.2 (13 Oct 2020 23:47)  HR: 91 (14 Oct 2020 01:43) (75 - 91)  BP: 114/61 (14 Oct 2020 01:43) (114/61 - 116/67)  RR: 18 (13 Oct 2020 23:47) (18 - 18)    Gen: NAD, sitting comfortably  Cardio: RRR, no m/r/g  Resp: CTAB, no w/r/r  Abd: Gravid, soft, NT, strongly palpable ctx, no CVA tenderness, no fundal tenderness  Speculum: grossly ruptured with cervix appearing approx 4cm dilated with fetal head visible  SVE: 5/100/-2, vtx, grossly ruptured  EFM: 135/mod/+accels  [place]: q5m  Sono: cephalic, ant placenta, BPP 8/8, MVP 8.28cm, EFW 822cm (77%)

## 2020-10-14 NOTE — OB RN PATIENT PROFILE - POST PARTUM DEPRESSION SCREEN OB 2
< from: 12 Lead ECG (03.02.18 @ 14:31) >      Ventricular Rate 60 BPM    Atrial Rate 60 BPM    P-R Interval 152 ms    QRS Duration 108 ms    Q-T Interval 440 ms    QTC Calculation(Bezet) 440 ms    P Axis 61 degrees    R Axis 86 degrees    T Axis 45 degrees    Diagnosis Line Normal sinus rhythm  Incomplete right bundle branch block    < end of copied text >
yes

## 2020-10-14 NOTE — CONSULT NOTE PEDS - SUBJECTIVE AND OBJECTIVE BOX
Ms. Laguerre is a 45 year old  who presented to  with rupture of membranes and  labor, currently at 24.5 weeks GA. Pregnancy is complicated by advanced maternal age and vaginal bleeding. Upon arrival today, she was found to have contractions and be dilated to 5 cm. She is s/p betamethasone x1, indomethacin, and GBS prophylaxis. EFW today 822 grams.   NICU consulted for risk of  delivery.   I spoke with Ms. Laguerre and her partner about the risks of delivering at 24 weeks GA, including the risk of  death, neurodevelopmental impairment, and  morbidities. Specifically, we discussed several complications of prematurity, including:   NICU was called to consult due to risk of periviable delivery.  We discussed the risks of delivery at the periviable age, including the risk of death, neurodevelopmental impairment, and cerebral palsy. At this time, parents desire full resuscitation. Specifically, we discussed several complications of prematurity includin. RDS: Due to the infants age, the lungs are immature. This was helped by betamethasone, but the infant will most likely still require intubation in the delivery room as part of initial stabilization. The infant may also require chest compressions or medications for resuscitation. Once the baby is stabilized in the delivery room, the infant will transfer to the NICU. We will give surfactant, a medication to help inflation of the lungs. The infant will require mechanical ventilation as his lungs mature; as soon as the infant is able to tolerate non-invasive ventilation, we will extubate the infant.  2. Sepsis: Due to the gestational age of the infant and  labor, there is a risk of sepsis. We will send a blood culture at birth and start the baby on antibiotics. The duration of antibiotics will depend on the culture results and clinical results of the baby.   3. Nutrition: Because of the infants age, the intestines are immature. Because of this, it is key that we give only breast milk to the infant. Maternal milk is best - I encouraged mom to pump after delivery, and we will arrange for a breast pump for after discharge. However, donor milk is also available and recommended for the infant if maternal milk is in short supply. This decreases the risk of NEC, a serious infection, and of feeding intolerance. It is also associated with better long-term outcomes.  4. IVH: Screening ultrasounds are done to look for bleeds in the brain in the first 72 hours of life and sequentially throughout the NICU stay. We cannot prevent all bleeds in  infants of this age. If a bleed occurs and it is small, it usually has a minimal role on neurodevelopment. However, larger bleeds often have impacts on long-term outcome, including the risk of cognitive delay or motor delay, such as cerebral palsy.  5. We anticipate that at this GA, the infant will have some long-term complications. These can vary from minor delays in milestones to more severe delays, such as cerebral palsy. We will perform developmentally appropriate care in the NICU, and after discharge, the infant will be followed by developmental pediatrics in additional to the regular pediatrician, as well as other subspecialists as needed.     Umbilical lines will be placed in the infant shortly after delivery. This will allow us to give TPN to the infant and other medications.     There are several other complications that can occur during the  period in infants born at this GA. We will discuss these with the family as/if they become applicable, and as developmentally appropriate.     At this GA, it is likely the infant will require to be in the NICU until the due date, or even longer. Due to the current concerns around coronavirus, I explained that the parents may visit once the coronavirus test results are negative.     Parents questions were answered. Please re-consult as needed and will be present at time of delivery.

## 2020-10-14 NOTE — PROGRESS NOTE ADULT - SUBJECTIVE AND OBJECTIVE BOX
PGY 2 Mag Note    Pt evaluated at bedside for magnesium check. Doing well, no acute complaints. Denies headaches, visual disturbances, CP, SOB, nausea, vomiting, RUQ/epigastric pain, or LE pain or swelling. Reports occasional contractions, 3/10 in intensity, pain well controlled.    Objective:   T(F): 98.24 (10-14 @ 08:10), Max: 98.24 (10-14 @ 08:10)  HR: 89 (10-14 @ 17:52)  BP: 92/51 (10-14 @ 17:52) (89/52 - 124/60)  RR: 18 (10-14 @ 07:00)  SpO2: 79% (10-14 @ 17:19)    Gen: NAD, AAOx3  CV: RRR  Pulm: CTABL  Abd: soft, non-tender, no rebound or guarding, no epigastric tenderness  : [place]   Ext: no edema henry, SCDs in place  Neuro: Reflexes 2+ in bilateral upper extremities    EFM: 140/mod/+accels, occasional variable decels  [place]: irritability    Urine output: ([id number]) 400cc    Medications:  azithromycin  IVPB 500 milliGRAM(s) IV Intermittent every 24 hours  ceFAZolin   IVPB      ceFAZolin   IVPB 1000 milliGRAM(s) IV Intermittent every 8 hours  influenza   Vaccine 0.5 milliLiter(s) IntraMuscular once  lactated ringers. 1000 milliLiter(s) IV Continuous <Continuous>  magnesium sulfate Infusion 2 Gm/Hr IV Continuous <Continuous>    penicillins (Unknown)      Labs:                        10.0   16.04 )-----------( 356      ( 14 Oct 2020 00:41 )             31.5               Protein/cr ratio:    Assessment:  45y G P at W D,  on magnesium for preeclampsia with/without severe features    Plan:  -Continue magnesium until:  -Repeat Mag level at  -Cont to monitor BPs  -Strict I/Os  -Pain management prn  -Monitor for signs and symptoms of Magnesium toxicity  -Cont efm/toco

## 2020-10-14 NOTE — CONSULT NOTE ADULT - SUBJECTIVE AND OBJECTIVE BOX
Chief Complaint: pain     46yo  @24w5d with CLAUDE  by first trimester sonogram presenting to L&D for pelvic pressure and clear leakage of fluid since 10/12 around 1500. Pressure began as irregular and is now q3-4m.  It is nonradiating, associated with rectal pressure, did not attempt pain relief with any OTC medication. No recent abdominal trauma or intercourse. Also reports 2 episodes of clear vaginal leaking since yesterday.  Denies vaginal bleeding.  Good fetal movement.  Otherwise denies fevers, chills, dysuria, hematuria.  Last saw her provider in August, has poor prenatal follow-up.  She had 2 weeks of 1st trimester bleeding which resolved spontaneously.  Sequential 1 with evidence of increased risk of Down Syndrome (1:109).  NIPT with low risk female. Denies any other complications with this pregnancy.  Denies HA, CP, SOB, N/V, diarrhea, constipation, abdominal pain.  Last PO intake @1800, last BM this AM, last intercourse 6 months ago.     Overnight, magnesium was started for neuroprotection and abx for gbs ppx, She currently denies ctx or abdominal pain, Denies CP, SOB, RUQ pain/ Epigastric pain, N/V, LE pain/ swelling, diarrhea, pain/difficulty with urination, fevers, chills. Denies abnormal/ foul smelling LOF. Denies uterine tenderness. Currently denies pelvic pressure.     Ob/Gyn History:  P1,  full term X1, 2006, @ [facility], no complications  SABX1, no D+C, no complications .   Denies history of uterine fibroids, abnormal paps, or STIs. Reports ovarian cyst, now resolved.   Last Pap Smear -  per patient     Home Medications: Pepcid PRN    Allergies:   penicillins (Unknown)- chidhood allergy     PAST MEDICAL & SURGICAL HISTORY:  Anxiety  GERD (gastroesophageal reflux disease)  History of D&amp;C    FAMILY HISTORY:  No pertinent family history in first degree relatives    SOCIAL HISTORY: Denies cigarette use, alcohol use, or illicit drug use    Vital Signs Last 24 Hrs  T(F): 98.24 (14 Oct 2020 08:10), Max: 98.24 (14 Oct 2020 08:10)  HR: 94 (14 Oct 2020 08:50) (75 - 100)  BP: 106/55 (14 Oct 2020 08:44) (103/56 - 144/62)  RR: 18 (14 Oct 2020 07:00) (18 - 18)  Height (cm): 152.4 (10-14-20 @ 02:09)  Weight (kg): 61.2 (10-14-20 @ 02:09)  BMI (kg/m2): 26.4 (10-14-20 @ 02:09)  BSA (m2): 1.58 (10-14-20 @ 02:09)    General Appearance - AAOx3, NAD  Heart - S1S2 regular rate and rhythm  Lung - CTA Bilaterally  Abdomen - Soft, nontender, nondistended, no rebound, no rigidity, no guarding, bowel sounds present, gravid  No uterine tenderness  No LE Edema  Reflexes +2 upper extremities bilaterally     GYN/Pelvis:   10/14 Speculum @0035: grossly ruptured with fetal head noted at cervix, cervix approx 4cm dilated  SVE @0101 5/100/-2    Meds:   10/14 Celestone #1 @0100; ancef 2g @0100   magnesium started @0440    (Floorstock) 1 each &lt;see task&gt; GiveOnce  (Floorstock) 1 each &lt;see task&gt; GiveOnce  (Floorstock) 1 each &lt;see task&gt; GiveOnce  betamethasone Injectable 12 milliGRAM(s) IntraMuscular once  ceFAZolin   IVPB 2000 milliGRAM(s) IV Intermittent once  indomethacin 50 milliGRAM(s) Oral once  magnesium sulfate  IVPB 4 Gram(s) IV Intermittent once    Height (cm): 152.4 (10-14-20 @ 02:09)  Weight (kg): 61.2 (10-14-20 @ 02:09)  BMI (kg/m2): 26.4 (10-14-20 @ 02:09)  BSA (m2): 1.58 (10-14-20 @ 02:09)    LABS:                        10.0   16.04 )-----------( 356      ( 14 Oct 2020 00:41 )             31.5       ABO RH Interpretation: O POS (10-14-20 @ 00:41)  Antibody Screen: NEG (10-14-20 @ 00:41)      Urinalysis Basic - ( 14 Oct 2020 00:41 )  Color: Yellow / Appearance: Clear / S.014 / pH: x  Gluc: x / Ketone: Trace  / Bili: Negative / Urobili: <2 mg/dL   Blood: x / Protein: Trace / Nitrite: Negative   Leuk Esterase: Small / RBC: 3 /HPF / WBC 10 /HPF   Sq Epi: x / Non Sq Epi: 4 /HPF / Bacteria: Negative      10/14 Sonogram: cephalic, ant placenta, BPP 8/8, MVP 8.28cm, EFW 822cm (77%)   Chief Complaint: pain     44yo  @24w5d with CLAUDE  by first trimester sonogram presenting to L&D for pelvic pressure and clear leakage of fluid since 10/12 around 1500. Pressure began as irregular and is now q3-4m.  It is nonradiating, associated with rectal pressure, did not attempt pain relief with any OTC medication. No recent abdominal trauma or intercourse. Also reports 2 episodes of clear vaginal leaking since yesterday.  Denies vaginal bleeding.  Good fetal movement.  Otherwise denies fevers, chills, dysuria, hematuria.  Last saw her provider in August, has poor prenatal follow-up.  She had 2 weeks of 1st trimester bleeding which resolved spontaneously.  Sequential 1 with evidence of increased risk of Down Syndrome (1:109).  NIPT with low risk female. Denies any other complications with this pregnancy.  Denies HA, CP, SOB, N/V, diarrhea, constipation, abdominal pain.  Last PO intake @1800, last BM this AM, last intercourse 6 months ago.     Overnight, magnesium was started for neuroprotection and abx for gbs ppx, She currently denies ctx or abdominal pain, Denies CP, SOB, RUQ pain/ Epigastric pain, N/V, LE pain/ swelling, diarrhea, pain/difficulty with urination, fevers, chills. Denies abnormal/ foul smelling LOF. Denies uterine tenderness. Currently denies pelvic pressure.     Ob/Gyn History:  P1,  full term X1, 2006, @ [facility], no complications  SABX1, no D+C, no complications .   Denies history of uterine fibroids, abnormal paps, or STIs. Reports ovarian cyst, now resolved.   Last Pap Smear -  per patient     Home Medications: Pepcid PRN    Allergies:   penicillins (Unknown)- chidhood allergy     PAST MEDICAL & SURGICAL HISTORY:  Anxiety  GERD (gastroesophageal reflux disease).  Small esophogus  History of D&amp;C    FAMILY HISTORY:  No pertinent family history in first degree relatives    SOCIAL HISTORY: Denies  alcohol use, or illicit drug use. Occasional cigarette use.    Review of systems:  No fevers, chills rashes, problems seeing, problems hearing, chest pain, palpitations, shortness of breath, nausea, vomiting, abdominal pain, difficulty urinating, seizures, or rashes.     Vital Signs Last 24 Hrs  T(F): 98.24 (14 Oct 2020 08:10), Max: 98.24 (14 Oct 2020 08:10)  HR: 94 (14 Oct 2020 08:50) (75 - 100)  BP: 106/55 (14 Oct 2020 08:44) (103/56 - 144/62)  RR: 18 (14 Oct 2020 07:00) (18 - 18)  Height (cm): 152.4 (10-14-20 @ 02:09)  Weight (kg): 61.2 (10-14-20 @ 02:09)  BMI (kg/m2): 26.4 (10-14-20 @ 02:09)  BSA (m2): 1.58 (10-14-20 @ 02:09)    General Appearance - AAOx3, NAD  Heart - S1S2 regular rate and rhythm  Lung - CTA Bilaterally  Abdomen - Soft, nontender, nondistended, no rebound, no rigidity, no guarding, bowel sounds present, gravid  No uterine tenderness  No LE Edema  Reflexes +2 upper extremities bilaterally     GYN/Pelvis:   10/14 Speculum @0035: grossly ruptured with fetal head noted at cervix, cervix approx 4cm dilated  SVE @0101 5/100/-2    Meds:   10/14 Celestone #1 @0100; ancef 2g @0100   magnesium started @0440    (Floorstock) 1 each &lt;see task&gt; GiveOnce  (Floorstock) 1 each &lt;see task&gt; GiveOnce  (Floorstock) 1 each &lt;see task&gt; GiveOnce  betamethasone Injectable 12 milliGRAM(s) IntraMuscular once  ceFAZolin   IVPB 2000 milliGRAM(s) IV Intermittent once  indomethacin 50 milliGRAM(s) Oral once  magnesium sulfate  IVPB 4 Gram(s) IV Intermittent once    Height (cm): 152.4 (10-14-20 @ 02:09)  Weight (kg): 61.2 (10-14-20 @ 02:09)  BMI (kg/m2): 26.4 (10-14-20 @ 02:09)  BSA (m2): 1.58 (10-14-20 @ 02:09)    LABS:                        10.0   16.04 )-----------( 356      ( 14 Oct 2020 00:41 )             31.5       ABO RH Interpretation: O POS (10-14-20 @ 00:41)  Antibody Screen: NEG (10-14-20 @ 00:41)      Urinalysis Basic - ( 14 Oct 2020 00:41 )  Color: Yellow / Appearance: Clear / S.014 / pH: x  Gluc: x / Ketone: Trace  / Bili: Negative / Urobili: <2 mg/dL   Blood: x / Protein: Trace / Nitrite: Negative   Leuk Esterase: Small / RBC: 3 /HPF / WBC 10 /HPF   Sq Epi: x / Non Sq Epi: 4 /HPF / Bacteria: Negative      10/14 Sonogram: cephalic, ant placenta, BPP 8/8, MVP 8.28cm, EFW 822cm (77%)

## 2020-10-14 NOTE — OB RN DELIVERY SUMMARY - NS_DELIVERYATTENDING1_OBGYN_ALL_OB_FT
Problem: Patient Care Overview  Goal: Interprofessional Rounds/Family Conf   02/13/19 1340   Interdisciplinary Rounds/Family Conf   Summary Palliative Care Interdisciplinary Round - Palliative Care Team Members present: WILBUR Garcia DO; NINI GARCIA, [facility]; WENCESLAO Hill RN, [facility]; ELIE Syed RN, [facility]; WENCESLAO Hall MDiv, [facility]; WENCESLAO CHAKRABORTYW, [facility]-; WENCESLAO Arguelles RN, [facility]       Problem: Palliative Care (Adult)  Intervention: Support/Optimize Psychosocial Response   02/13/19 1020   Coping/Psychosocial Interventions   Supportive Measures active listening utilized;positive reinforcement provided;other (see comments)  (symptom assessment)   Psychosocial Support   Family/Support System Care self-care encouraged;support provided   Visit with patient and patient's dtr at bedside - pt resting in chair, dtr resting on the couch.  Provided with information about home palliative care, they are agreeable and would like to access these service - palliative RN to make referral to [facility] for palliative follow in the home.         Mary, Eman

## 2020-10-14 NOTE — PROGRESS NOTE ADULT - SUBJECTIVE AND OBJECTIVE BOX
Subjective:   Pt evaluated at bedside for magnesium check. She denies HA, changes in vision, or right upper quadrant pain. Denies CP, SOB, N/V, new onset LE swelling, calf tenderness.  Pain controlled.  Reports irregular contractions, less severe than yesterday.  Otherwise denies vaginal bleeding, fevers, chills.  Good fetal movement.    Objective:   Vital Signs Last 24 Hrs  T(C): 36.8 (14 Oct 2020 08:10), Max: 36.8 (13 Oct 2020 23:47)  T(F): 98.24 (14 Oct 2020 08:10), Max: 98.24 (14 Oct 2020 08:10)  HR: 89 (14 Oct 2020 22:15) (75 - 108)  BP: 96/53 (14 Oct 2020 22:07) (84/46 - 144/62) first elevated 10/14 @0314  RR: 18 (14 Oct 2020 17:37) (18 - 18)  SpO2: 97% (14 Oct 2020 22:15) (59% - 100%)    UO: 3271-5014 230cc, 76cc/hr, adequate 31cc/hr    EFM: 135/mod/+accel  TOCO: irreg  SVE: 5/100/-2 10/14 @0101  Gen: NAD, AAOx3  CV: RRR, no M/R/G  Pulm: CTAB, no R/R/W  Abd: soft, gravid, nontender, no rebound or guarding, no epigastric tenderness, liver nonpalpable +BS, no fundal tenderness  : [place]   Ext: no edema henry, SCDs in place  Neuro: 2+ BL upper extremity reflexes    Medications:  azithromycin  IVPB 500 milliGRAM(s) IV Intermittent every 24 hours  ceFAZolin   IVPB      ceFAZolin   IVPB 1000 milliGRAM(s) IV Intermittent every 8 hours  influenza   Vaccine 0.5 milliLiter(s) IntraMuscular once  lactated ringers. 1000 milliLiter(s) IV Continuous <Continuous>  magnesium sulfate Infusion 2 Gm/Hr IV Continuous <Continuous>    penicillins (Unknown)      Labs:                        10.0   16.04 )-----------( 356      ( 14 Oct 2020 00:41 )             31.5   rubella immune  RPR NR  UDS neg  COVID neg  HIV NR

## 2020-10-14 NOTE — PROGRESS NOTE ADULT - ASSESSMENT
A/P: 46yo  at 24w5d, GBS unknown, AMA, elevated Trisomy 21 risk on Seq 1 with low risk NIPT, with PPROM on 10/12 in the AM, s/p celestone dose #1, on latency antibiotics, on magnesium for neuroprotection.     -Cont efm/toco  -Continue with magnesium  -Neuro checks [id number]  -clear liquid diet, IVF  -celestone dose #2 at 0130  -ancef and azithromycin for latency antibiotics  -pain management prn  -s/p NICU consult, reccs appreciated  -SCDs  -PNV  -f/u Ucx, GBS, vaginitis panel  -needs SW consult postpartum    Dr. Rosas and Dr. Bravo to be made  aware

## 2020-10-14 NOTE — PROGRESS NOTE ADULT - SUBJECTIVE AND OBJECTIVE BOX
PGY1 Magnesium Note     Subjective:   Pt evaluated at bedside for magnesium check. She denies headache, vision changes, dizziness, SOB, chest pain, RUQ/epigastric pain.    Objective:   Vital signs: T(F): 98.24 (10-14-20 @ 08:10), Max: 98.24 (10-14-20 @ 08:10)  HR: 96 (10-14-20 @ 11:00) (75 - 108)  BP: 106/56 (10-14-20 @ 10:59) (103/56 - 144/62)  RR: 18 (10-14-20 @ 07:00) (18 - 18)  SpO2: 98% (10-14-20 @ 10:55) (89% - 100%)    10-13-20 @ 07:01  -  10-14-20 @ 07:00  --------------------------------------------------------  IN: 250 mL / OUT: 1000 mL / NET: -750 mL    10-14-20 @ 07:01  -  10-14-20 @ 11:04  --------------------------------------------------------  IN: 500 mL / OUT: 925 mL / NET: -425 mL    UO: min 30.5cc/hr 8984-0962: 250cc    Gen: NAD, AAOx3  CV: S1S2, RRR, no M/R/G  Pulm: CTAB, no rhonchi or rales or wheezing  Ext: no calf tenderness or edema, SCDs in place  Neuro: biceps reflex 2+ bilaterally  Abd: soft, GRAVId, nontender, no rebound or guarding, no epigastric tenderness    EFM: 135bpm/moderate variability/no accelerations/no decelerations  [place]: q8mins  SVE:  5/100/-2 v/rupture    Medications:    betamethasone Injectable: 12 (10-14-20 @ 01:24)  ceFAZolin   IVPB: 100 (10-14-20 @ 01:24)  ceFAZolin   IVPB: 100 (10-14-20 @ 09:23)  magnesium sulfate  IVPB: 200 (10-14-20 @ 04:40)  magnesium sulfate Infusion: 50 (10-14-20 @ 04:23)      Labs:                         10.0   16.04 )-----------( 356      ( 14 Oct 2020 00:41 )             31.5           Urinalysis Basic - ( 14 Oct 2020 00:41 )    Color: Yellow / Appearance: Clear / S.014 / pH: x  Gluc: x / Ketone: Trace  / Bili: Negative / Urobili: <2 mg/dL   Blood: x / Protein: Trace / Nitrite: Negative   Leuk Esterase: Small / RBC: 3 /HPF / WBC 10 /HPF   Sq Epi: x / Non Sq Epi: 4 /HPF / Bacteria: Negative        A/P: 45y GXPXs at XXGA with preeclampsia with severe features on magnesium for seizure prophylaxis  - continue magnesium until XXXX  - next labs and magnesium level at XXXX  - f/u BPs; Current BPs XXXX  - stricts I/Os  - continuous EFM and toco  - Seizure precautions  - Reevaluate in 2hours     Case discussed with chief resident, and Dr. MATTIE hernández.    PGY1 Magnesium Note     Subjective:   Pt evaluated at bedside for magnesium check. She denies headache, vision changes, dizziness, SOB, chest pain, RUQ/epigastric pain.    Objective:   Vital signs: T(F): 98.24 (10-14-20 @ 08:10), Max: 98.24 (10-14-20 @ 08:10)  HR: 96 (10-14-20 @ 11:00) (75 - 108)  BP: 106/56 (10-14-20 @ 10:59) (103/56 - 144/62)  RR: 18 (10-14-20 @ 07:00) (18 - 18)  SpO2: 98% (10-14-20 @ 10:55) (89% - 100%)    10-13-20 @ 07:01  -  10-14-20 @ 07:00  --------------------------------------------------------  IN: 250 mL / OUT: 1000 mL / NET: -750 mL    10-14-20 @ 07:01  -  10-14-20 @ 11:04  --------------------------------------------------------  IN: 500 mL / OUT: 925 mL / NET: -425 mL    UO: min 30.5cc/hr 8151-8791: 250cc    Gen: NAD, AAOx3  CV: S1S2, RRR, no M/R/G  Pulm: CTAB, no rhonchi or rales or wheezing  Ext: no calf tenderness or edema, SCDs in place  Neuro: biceps reflex 2+ bilaterally  Abd: soft, GRAVId, nontender, no rebound or guarding, no epigastric tenderness    EFM: 135bpm/moderate variability/no accelerations/no decelerations  [place]: q8mins  SVE:  5/100/-2 v/ruptured    Medications:    betamethasone Injectable: 12 (10-14-20 @ 01:24)  ceFAZolin   IVPB: 100 (10-14-20 @ 01:24)  ceFAZolin   IVPB: 100 (10-14-20 @ 09:23)  magnesium sulfate  IVPB: 200 (10-14-20 @ 04:40)  magnesium sulfate Infusion: 50 (10-14-20 @ 04:23)      Labs:                         10.0   16.04 )-----------( 356      ( 14 Oct 2020 00:41 )             31.5           Urinalysis Basic - ( 14 Oct 2020 00:41 )    Color: Yellow / Appearance: Clear / S.014 / pH: x  Gluc: x / Ketone: Trace  / Bili: Negative / Urobili: <2 mg/dL   Blood: x / Protein: Trace / Nitrite: Negative   Leuk Esterase: Small / RBC: 3 /HPF / WBC 10 /HPF   Sq Epi: x / Non Sq Epi: 4 /HPF / Bacteria: Negative

## 2020-10-14 NOTE — CHART NOTE - NSCHARTNOTEFT_GEN_A_CORE
OBGYN PGY4 Note:     Pt extremely difficult and combative. Unwilling to be kept on fetal heart monitor.   Re-offered indomethacin for tocolysis and Mg for neuroprotection, pt stated understanding that delivery may be imminent and is refusing all interventions.     - will have patient sign refusal of treatment form.

## 2020-10-14 NOTE — OB PROVIDER H&P - NS_OBGYNHISTORY_OBGYN_ALL_OB_FT
OB: FT  x1, 6-1, no complications    GYN: Denies fibroids, cysts, abnormal pap smears, STDs. OB: FT  x1, 6-1, no complications  SAB x1, no D&C    GYN: Denies fibroids, cysts, abnormal pap smears, STDs.

## 2020-10-14 NOTE — PROGRESS NOTE ADULT - ASSESSMENT
44yo  at 24w5d, GBS unknown, AMA, elevated Trisomy 21 risk on Seq 1 with low risk NIPT, with PPROM on 10/12 in the AM, with protracted  labor, s/p celestone dose #1, on latency antibiotics, on magnesium for neuroprotection.     -cont efm/toco  -Continue with magnesium  -Neuro checks [id number]  -clear liquid diet, IVF  -celestone dose #2 at 0130  -ancef for gbs prophylaxis  -pain management prn  -s/p NICU consult, reccs appreciated  -SCDs  -PNV  -f/u Ucx, GBS, vaginitis panel  - needs SW consult postpartum      Dr. durbin to be made  aware

## 2020-10-15 ENCOUNTER — RESULT REVIEW (OUTPATIENT)
Age: 45
End: 2020-10-15

## 2020-10-15 DIAGNOSIS — O32.8XX0 MATERNAL CARE FOR OTHER MALPRESENTATION OF FETUS, NOT APPLICABLE OR UNSPECIFIED: ICD-10-CM

## 2020-10-15 LAB
CULTURE RESULTS: NO GROWTH — SIGNIFICANT CHANGE UP
GROUP B BETA STREP DNA (PCR): DETECTED
GROUP B BETA STREP INTERPRETATION: SIGNIFICANT CHANGE UP
SOURCE GROUP B STREP: SIGNIFICANT CHANGE UP
SPECIMEN SOURCE: SIGNIFICANT CHANGE UP

## 2020-10-15 PROCEDURE — 88305 TISSUE EXAM BY PATHOLOGIST: CPT | Mod: 26

## 2020-10-15 RX ORDER — CEFAZOLIN SODIUM 1 G
2000 VIAL (EA) INJECTION EVERY 8 HOURS
Refills: 0 | Status: COMPLETED | OUTPATIENT
Start: 2020-10-15 | End: 2020-10-15

## 2020-10-15 RX ORDER — KETOROLAC TROMETHAMINE 30 MG/ML
30 SYRINGE (ML) INJECTION EVERY 6 HOURS
Refills: 0 | Status: DISCONTINUED | OUTPATIENT
Start: 2020-10-15 | End: 2020-10-16

## 2020-10-15 RX ORDER — SIMETHICONE 80 MG/1
80 TABLET, CHEWABLE ORAL EVERY 4 HOURS
Refills: 0 | Status: DISCONTINUED | OUTPATIENT
Start: 2020-10-15 | End: 2020-10-20

## 2020-10-15 RX ORDER — OXYCODONE HYDROCHLORIDE 5 MG/1
5 TABLET ORAL ONCE
Refills: 0 | Status: DISCONTINUED | OUTPATIENT
Start: 2020-10-15 | End: 2020-10-20

## 2020-10-15 RX ORDER — FAMOTIDINE 10 MG/ML
20 INJECTION INTRAVENOUS ONCE
Refills: 0 | Status: COMPLETED | OUTPATIENT
Start: 2020-10-15 | End: 2020-10-16

## 2020-10-15 RX ORDER — LANOLIN
1 OINTMENT (GRAM) TOPICAL EVERY 6 HOURS
Refills: 0 | Status: DISCONTINUED | OUTPATIENT
Start: 2020-10-15 | End: 2020-10-20

## 2020-10-15 RX ORDER — MAGNESIUM HYDROXIDE 400 MG/1
30 TABLET, CHEWABLE ORAL
Refills: 0 | Status: DISCONTINUED | OUTPATIENT
Start: 2020-10-15 | End: 2020-10-20

## 2020-10-15 RX ORDER — OXYTOCIN 10 UNIT/ML
333.33 VIAL (ML) INJECTION
Qty: 20 | Refills: 0 | Status: DISCONTINUED | OUTPATIENT
Start: 2020-10-15 | End: 2020-10-20

## 2020-10-15 RX ORDER — IBUPROFEN 200 MG
600 TABLET ORAL EVERY 6 HOURS
Refills: 0 | Status: DISCONTINUED | OUTPATIENT
Start: 2020-10-15 | End: 2020-10-15

## 2020-10-15 RX ORDER — ACETAMINOPHEN 500 MG
975 TABLET ORAL EVERY 6 HOURS
Refills: 0 | Status: DISCONTINUED | OUTPATIENT
Start: 2020-10-15 | End: 2020-10-20

## 2020-10-15 RX ORDER — ACETAMINOPHEN 500 MG
975 TABLET ORAL
Refills: 0 | Status: DISCONTINUED | OUTPATIENT
Start: 2020-10-15 | End: 2020-10-15

## 2020-10-15 RX ORDER — OXYCODONE HYDROCHLORIDE 5 MG/1
5 TABLET ORAL
Refills: 0 | Status: COMPLETED | OUTPATIENT
Start: 2020-10-15 | End: 2020-10-22

## 2020-10-15 RX ORDER — DIPHENHYDRAMINE HCL 50 MG
25 CAPSULE ORAL EVERY 6 HOURS
Refills: 0 | Status: DISCONTINUED | OUTPATIENT
Start: 2020-10-15 | End: 2020-10-20

## 2020-10-15 RX ORDER — SODIUM CHLORIDE 9 MG/ML
1000 INJECTION, SOLUTION INTRAVENOUS
Refills: 0 | Status: DISCONTINUED | OUTPATIENT
Start: 2020-10-15 | End: 2020-10-17

## 2020-10-15 RX ORDER — ENOXAPARIN SODIUM 100 MG/ML
40 INJECTION SUBCUTANEOUS EVERY 24 HOURS
Refills: 0 | Status: DISCONTINUED | OUTPATIENT
Start: 2020-10-15 | End: 2020-10-20

## 2020-10-15 RX ORDER — IBUPROFEN 200 MG
600 TABLET ORAL EVERY 6 HOURS
Refills: 0 | Status: DISCONTINUED | OUTPATIENT
Start: 2020-10-15 | End: 2020-10-20

## 2020-10-15 RX ORDER — ACETAMINOPHEN 500 MG
1000 TABLET ORAL ONCE
Refills: 0 | Status: COMPLETED | OUTPATIENT
Start: 2020-10-15 | End: 2020-10-15

## 2020-10-15 RX ADMIN — Medication 5 MILLIGRAM(S): at 23:35

## 2020-10-15 RX ADMIN — Medication 1000 MILLIGRAM(S): at 09:30

## 2020-10-15 RX ADMIN — SODIUM CHLORIDE 125 MILLILITER(S): 9 INJECTION, SOLUTION INTRAVENOUS at 14:37

## 2020-10-15 RX ADMIN — Medication 30 MILLIGRAM(S): at 12:19

## 2020-10-15 RX ADMIN — Medication 30 MILLIGRAM(S): at 17:26

## 2020-10-15 RX ADMIN — Medication 12 MILLIGRAM(S): at 01:21

## 2020-10-15 RX ADMIN — Medication 975 MILLIGRAM(S): at 14:42

## 2020-10-15 RX ADMIN — Medication 30 MILLIGRAM(S): at 14:11

## 2020-10-15 RX ADMIN — Medication 1000 MILLIGRAM(S): at 01:21

## 2020-10-15 RX ADMIN — ENOXAPARIN SODIUM 40 MILLIGRAM(S): 100 INJECTION SUBCUTANEOUS at 17:26

## 2020-10-15 RX ADMIN — Medication 100 MILLIGRAM(S): at 14:37

## 2020-10-15 RX ADMIN — Medication 975 MILLIGRAM(S): at 22:26

## 2020-10-15 RX ADMIN — Medication 100 MILLIGRAM(S): at 23:33

## 2020-10-15 RX ADMIN — Medication 400 MILLIGRAM(S): at 00:10

## 2020-10-15 RX ADMIN — Medication 160 MILLIGRAM(S): at 21:58

## 2020-10-15 RX ADMIN — Medication 400 MILLIGRAM(S): at 08:33

## 2020-10-15 NOTE — PROGRESS NOTE ADULT - ASSESSMENT
A/P: 46yo  at 24w6d, GBS unknown, AMA, elevated Trisomy 21 risk on Seq 1 with low risk NIPT, with PPROM on 10/12 in the AM, s/p celestone dose x2, on latency antibiotics, on magnesium for neuroprotection.     -Cont EFM/[place]  -Continue with magnesium until 24hrs after 2nd dose of celestone  -Neuro checks [id number]  -seizure precautions  -clear liquid diet, IVF  -strict I/Os, walker in place  -ancef and azithromycin for latency antibiotics  -pain management with IV tylenol prn  -s/p NICU consult, recs appreciated  -SCDs  -PNV  -f/u Ucx, GBS, vaginitis panel  -needs SW consult postpartum    Dr. Acharya and Dr. Bravo aware.

## 2020-10-15 NOTE — PROGRESS NOTE ADULT - SUBJECTIVE AND OBJECTIVE BOX
OBGYN PGY4 Note:     Pt seen and evaluated at bedside for complaints of vaginal pressure and "pulsation".   Speculum exam: fetal parts noted in the vagina   SVE: bilateral feet palpated in vagina with cervix 5cm dilated.   On ultrasound-  BPM; double footling breech presentation with feet in the vagina.    R/B/A of primary  section explained, pt stated understanding and signed the consent. Taken to the OR for urgent C/S.     - NICU and anesthesia made aware     Dr. Negron aware.

## 2020-10-15 NOTE — BRIEF OPERATIVE NOTE - NSICDXBRIEFPROCEDURE_GEN_ALL_CORE_FT
PROCEDURES:  Primary low transverse  section 15-Oct-2020 07:00:41 incision extended 1cm vertically in midline Kalyani Acharya

## 2020-10-15 NOTE — PROGRESS NOTE ADULT - SUBJECTIVE AND OBJECTIVE BOX
PGY1 Magnesium Note     Subjective:   Pt evaluated at bedside for magnesium check. She denies headache, vision changes, dizziness, SOB, chest pain, RUQ/epigastric pain.  Denies ctx, VB/LOF. Good FM.     Objective:   Vital signs: T(F): 98.06 (10-15-20 @ 02:07), Max: 98.06 (10-15-20 @ 02:07)  HR: 77 (10-15-20 @ 04:20) (70 - 105)  BP: 98/51 (10-15-20 @ 04:23) (82/47 - 111/56)  RR: 18 (10-14-20 @ 17:37) (18 - 18)  SpO2: 96% (10-15-20 @ 04:20) (59% - 100%)    10-13-20 @ 07:01  -  10-14-20 @ 07:00  --------------------------------------------------------  IN: 250 mL / OUT: 1000 mL / NET: -750 mL    10-14-20 @ 07:01  -  10-15-20 @ 04:25  --------------------------------------------------------  IN: 2675 mL / OUT: 3445 mL / NET: -770 mL        Gen: NAD, AAOx3  CV: S1S2, RRR, no M/R/G  Pulm: CTAB  Ext: no calf tenderness or edema SCDs in place  Neuro: 2+ DTR bilaterally  Abd: soft, gravid, nontender, no rebound or guarding, no epigastric tenderness  LOCHIA: minimal bleeding  UO: (9994-6473) 50cc    EFM: 135/moderate/+accels  [place]: irritability  SVE: deferred    Medications:  acetaminophen  IVPB ..: 400 (10-14-20 @ 16:00)  acetaminophen  IVPB ..: 400 (10-15-20 @ 00:10)  azithromycin  IVPB: 255 (10-14-20 @ 12:25)  betamethasone Injectable: 12 (10-15-20 @ 01:21)  ceFAZolin   IVPB: 100 (10-14-20 @ 21:56),  100 (10-14-20 @ 17:55),  100 (10-14-20 @ 09:23)  magnesium sulfate  IVPB: 200 (10-14-20 @ 04:40)      Labs:                         10.0   16.04 )-----------( 356      ( 14 Oct 2020 00:41 )             31.5           Urinalysis Basic - ( 14 Oct 2020 00:41 )    Color: Yellow / Appearance: Clear / S.014 / pH: x  Gluc: x / Ketone: Trace  / Bili: Negative / Urobili: <2 mg/dL   Blood: x / Protein: Trace / Nitrite: Negative   Leuk Esterase: Small / RBC: 3 /HPF / WBC 10 /HPF   Sq Epi: x / Non Sq Epi: 4 /HPF / Bacteria: Negative

## 2020-10-15 NOTE — PROGRESS NOTE ADULT - ASSESSMENT
A/P: 44yo  at 24w6d, GBS unknown, AMA, elevated Trisomy 21 risk on Seq 1 with low risk NIPT, with PPROM on 10/12 in the AM, s/p celestone dose #1, on latency antibiotics, on magnesium for neuroprotection.     -Cont efm/toco  -Continue with magnesium until 24hrs after 2nd dose of celestone  -Neuro checks [id number]  -seizure precautions  -clear liquid diet, IVF  -celestone dose #2 10/15 @0130  -strict I/Os, walker in place  -ancef and azithromycin for latency antibiotics  -pain management with IV tylenol prn  -s/p NICU consult, reccs appreciated  -SCDs  -PNV  -f/u Ucx, GBS, vaginitis panel  -needs SW consult postpartum    Dr. Acharya and Dr. Bravo aware.

## 2020-10-15 NOTE — CHART NOTE - NSCHARTNOTEFT_GEN_A_CORE
PACU ANESTHESIA ADMISSION NOTE      Procedure: Primary low transverse  section      Post op diagnosis:  Double footling breech presentation        ____  Intubated  TV:______       Rate: ______      FiO2: ______    __x__  Patent Airway    __x__  Full return of protective reflexes    __x__  Full recovery from anesthesia / back to baseline status    Vitals:  T(C): 36.7 (10-15-20 @ 02:07), Max: 36.8 (10-14-20 @ 08:10)  HR: 105 (10-15-20 @ 06:26) (70 - 122)  BP: 112/63 (10-15-20 @ 06:26) (82/47 - 122/59)  RR: 18 (10-14-20 @ 17:37) (18 - 18)  SpO2: 98% (10-15-20 @ 06:26) (59% - 100%)    Mental Status:  __x__ Awake   ___x__ Alert   _____ Drowsy   _____ Sedated    Nausea/Vomiting:  __x__ NO  ______Yes,   See Post - Op Orders          Pain Scale (0-10):  _____    Treatment: ____ None    __x__ See Post - Op/PCA Orders    Post - Operative Fluids:   ____ Oral   __x__ See Post - Op Orders    Plan: Discharge:   ____Home       __X___Floor     _____Critical Care    _____  Other:_________________    Comments: Patient had smooth intraoperative event, no anesthesia complication.  PACU Vital signs: HR:   91         BP:101       /  59        RR:  18           O2 Sat:   97    %     Temp 36c

## 2020-10-15 NOTE — PROGRESS NOTE ADULT - SUBJECTIVE AND OBJECTIVE BOX
Subjective:   Pt evaluated at bedside for magnesium check. She denies HA, changes in vision, or right upper quadrant pain. Denies CP, SOB, N/V, new onset LE swelling, calf tenderness.  Pain controlled.    Objective:   Vital Signs Last 24 Hrs  T(C): 36.8 (14 Oct 2020 08:10), Max: 36.8 (14 Oct 2020 08:10)  T(F): 98.24 (14 Oct 2020 08:10), Max: 98.24 (14 Oct 2020 08:10)  HR: 83 (15 Oct 2020 00:40) (73 - 108)  BP: 96/53 (15 Oct 2020 00:37) (84/46 - 144/62)  BP(mean): --  RR: 18 (14 Oct 2020 17:37) (18 - 18)  SpO2: 96% (15 Oct 2020 00:40) (59% - 100%)    UO: 8349-2961 295cc, 98cc/hr, adequate 32cc/hr    EFM: 145/mod/no accel  TOCO: irreg  SVE: 5/100/-2 10/14 @0101  Gen: NAD, AAOx3  CV: RRR, no M/R/G  Pulm: CTAB, no R/R/W  Abd: soft, gravid, nontender, no rebound or guarding, no epigastric tenderness, liver nonpalpable +BS, no fundal tenderness  : [place]   Ext: no edema henry, SCDs in place  Neuro: 2+ BL upper extremity reflexes    Medications:  azithromycin  IVPB 500 milliGRAM(s) IV Intermittent every 24 hours  betamethasone Injectable 12 milliGRAM(s) IntraMuscular once  ceFAZolin   IVPB      ceFAZolin   IVPB 1000 milliGRAM(s) IV Intermittent every 8 hours  influenza   Vaccine 0.5 milliLiter(s) IntraMuscular once  lactated ringers. 1000 milliLiter(s) IV Continuous <Continuous>  magnesium sulfate Infusion 2 Gm/Hr IV Continuous <Continuous>    penicillins (Unknown)      Labs:                        10.0   16.04 )-----------( 356      ( 14 Oct 2020 00:41 )             31.5     rubella immune  RPR NR  UDS neg  COVID neg  HIV NR  HBsAg NR

## 2020-10-15 NOTE — BRIEF OPERATIVE NOTE - OPERATION/FINDINGS
live female infant with APGARs 6/8 delivered in double footling breech presentation, both feet found to be in the vagina.   Placental abruption noted.   Uterine incision extended 1cm vertically in the midline. Normal appearing uterus with ballooned-out lower segment, normal appearing tubes/ovaries B/L.

## 2020-10-15 NOTE — PROGRESS NOTE ADULT - SUBJECTIVE AND OBJECTIVE BOX
MARLON SANDOVAL  45y  Female  CC: Postpartum  PGY1 Note:  Patient seen and examined bedside. Denies heavy vaginal bleeding and reports pain well controlled. Not yet out of bed. On regular diet, however not yet attempted to try regular food. Denies flatus. Breastfeeding. Denies f/c/n/v. Denies dizziness/lightheadedness, SOB or palpitations.     PAST MEDICAL & SURGICAL HISTORY:  Anxiety    GERD (gastroesophageal reflux disease)    History of D&amp;C        Physical Exam  Vital Signs Last 24 Hrs  T(C): 36.1 (15 Oct 2020 10:37), Max: 37.2 (15 Oct 2020 07:00)  T(F): 97 (15 Oct 2020 10:37), Max: 99 (15 Oct 2020 07:00)  HR: 74 (15 Oct 2020 10:37) (70 - 122)  BP: 98/53 (15 Oct 2020 10:37) (82/47 - 112/63)  RR: 18 (15 Oct 2020 10:37) (18 - 18)  SpO2: 96% (15 Oct 2020 09:18) (59% - 100%)    Gen: AAOx3, NAD  CV: RRR. No murmors gallops or rubs.  Pulm: CTAB. No wheezes or rales.  Ext: No calf tenderness, no swelling.   Abd: Soft, nontender, nondistended, BS+  Fundus firm, and below umbilicus. Nontender.   Lochia: Minimal, rubra  Wound: dressing dry intact. No surrounding edema or erythema.     Urine output: [id number], 175cc, 70cc/hr    Labs:                        10.0   16.04 )-----------( 356      ( 14 Oct 2020 00:41 )             31.5        MEDICATIONS  (STANDING):  acetaminophen   Tablet .. 975 milliGRAM(s) Oral <User Schedule>  bisacodyl 5 milliGRAM(s) Oral at bedtime  ceFAZolin   IVPB 2000 milliGRAM(s) IV Intermittent every 8 hours  enoxaparin Injectable 40 milliGRAM(s) SubCutaneous every 24 hours  ibuprofen  Tablet. 600 milliGRAM(s) Oral every 6 hours  influenza   Vaccine 0.5 milliLiter(s) IntraMuscular once  ketorolac   Injectable 30 milliGRAM(s) IV Push every 6 hours  lactated ringers. 1000 milliLiter(s) (125 mL/Hr) IV Continuous <Continuous>  oxytocin Infusion 333.333 milliUNIT(s)/Min (1000 mL/Hr) IV Continuous <Continuous>  simethicone 80 milliGRAM(s) Chew every 4 hours    MEDICATIONS  (PRN):  diphenhydrAMINE 25 milliGRAM(s) Oral every 6 hours PRN Itching  lanolin Ointment 1 Application(s) Topical every 6 hours PRN Sore Nipples  magnesium hydroxide Suspension 30 milliLiter(s) Oral two times a day PRN Constipation  oxyCODONE    IR 5 milliGRAM(s) Oral every 3 hours PRN Moderate to Severe Pain (4-10)  oxyCODONE    IR 5 milliGRAM(s) Oral once PRN Moderate to Severe Pain (4-10)

## 2020-10-15 NOTE — PROGRESS NOTE ADULT - ASSESSMENT
44yo now P2, s/p primary LTCS for breech, AMA, recovering well   - encourage ambulation  - PO hydration  - Continue regular Diet as tolerated  - DVT ppx lovneox, scds  -Incentive Spirometry bedside   - f/u 2000 CBC      and  to be made aware 46yo now P2, s/p primary LTCS for breech, POD#0, AMA, recovering well   - encourage ambulation  - PO hydration  - Continue regular Diet as tolerated  - DVT ppx lovneox, scds  -Incentive Spirometry bedside   - f/u 2000 CBC      and  to be made aware

## 2020-10-16 LAB
A VAGINAE DNA VAG QL NAA+PROBE: SIGNIFICANT CHANGE UP
BASOPHILS # BLD AUTO: 0.02 K/UL — SIGNIFICANT CHANGE UP (ref 0–0.2)
BASOPHILS NFR BLD AUTO: 0.2 % — SIGNIFICANT CHANGE UP (ref 0–1)
BVAB2 DNA VAG QL NAA+PROBE: SIGNIFICANT CHANGE UP
C ALBICANS DNA VAG QL NAA+PROBE: NEGATIVE — SIGNIFICANT CHANGE UP
C GLABRATA DNA VAG QL NAA+PROBE: NEGATIVE — SIGNIFICANT CHANGE UP
C KRUSEI DNA VAG QL NAA+PROBE: NEGATIVE — SIGNIFICANT CHANGE UP
C LUSITANIAE DNA VAG QL NAA+PROBE: NEGATIVE — SIGNIFICANT CHANGE UP
C TRACH RRNA SPEC QL NAA+PROBE: NEGATIVE — SIGNIFICANT CHANGE UP
EOSINOPHIL # BLD AUTO: 0.13 K/UL — SIGNIFICANT CHANGE UP (ref 0–0.7)
EOSINOPHIL NFR BLD AUTO: 1.2 % — SIGNIFICANT CHANGE UP (ref 0–8)
HCT VFR BLD CALC: 22.4 % — LOW (ref 37–47)
HGB BLD-MCNC: 7 G/DL — LOW (ref 12–16)
IMM GRANULOCYTES NFR BLD AUTO: 0.8 % — HIGH (ref 0.1–0.3)
LYMPHOCYTES # BLD AUTO: 1.85 K/UL — SIGNIFICANT CHANGE UP (ref 1.2–3.4)
LYMPHOCYTES # BLD AUTO: 17.2 % — LOW (ref 20.5–51.1)
MCHC RBC-ENTMCNC: 28.3 PG — SIGNIFICANT CHANGE UP (ref 27–31)
MCHC RBC-ENTMCNC: 31.3 G/DL — LOW (ref 32–37)
MCV RBC AUTO: 90.7 FL — SIGNIFICANT CHANGE UP (ref 81–99)
MEGA1 DNA VAG QL NAA+PROBE: SIGNIFICANT CHANGE UP
MONOCYTES # BLD AUTO: 0.7 K/UL — HIGH (ref 0.1–0.6)
MONOCYTES NFR BLD AUTO: 6.5 % — SIGNIFICANT CHANGE UP (ref 1.7–9.3)
N GONORRHOEA RRNA SPEC QL NAA+PROBE: NEGATIVE — SIGNIFICANT CHANGE UP
NEUTROPHILS # BLD AUTO: 7.99 K/UL — HIGH (ref 1.4–6.5)
NEUTROPHILS NFR BLD AUTO: 74.1 % — SIGNIFICANT CHANGE UP (ref 42.2–75.2)
NRBC # BLD: 0 /100 WBCS — SIGNIFICANT CHANGE UP (ref 0–0)
PLATELET # BLD AUTO: 248 K/UL — SIGNIFICANT CHANGE UP (ref 130–400)
RBC # BLD: 2.47 M/UL — LOW (ref 4.2–5.4)
RBC # FLD: 14 % — SIGNIFICANT CHANGE UP (ref 11.5–14.5)
T VAGINALIS RRNA SPEC QL NAA+PROBE: NEGATIVE — SIGNIFICANT CHANGE UP
WBC # BLD: 10.78 K/UL — SIGNIFICANT CHANGE UP (ref 4.8–10.8)
WBC # FLD AUTO: 10.78 K/UL — SIGNIFICANT CHANGE UP (ref 4.8–10.8)

## 2020-10-16 RX ORDER — FAMOTIDINE 10 MG/ML
20 INJECTION INTRAVENOUS EVERY 12 HOURS
Refills: 0 | Status: DISCONTINUED | OUTPATIENT
Start: 2020-10-16 | End: 2020-10-20

## 2020-10-16 RX ORDER — OXYCODONE HYDROCHLORIDE 5 MG/1
5 TABLET ORAL
Refills: 0 | Status: DISCONTINUED | OUTPATIENT
Start: 2020-10-16 | End: 2020-10-20

## 2020-10-16 RX ORDER — OXYCODONE HYDROCHLORIDE 5 MG/1
5 TABLET ORAL EVERY 6 HOURS
Refills: 0 | Status: DISCONTINUED | OUTPATIENT
Start: 2020-10-16 | End: 2020-10-20

## 2020-10-16 RX ADMIN — Medication 30 MILLIGRAM(S): at 00:46

## 2020-10-16 RX ADMIN — SIMETHICONE 80 MILLIGRAM(S): 80 TABLET, CHEWABLE ORAL at 21:03

## 2020-10-16 RX ADMIN — SIMETHICONE 80 MILLIGRAM(S): 80 TABLET, CHEWABLE ORAL at 02:24

## 2020-10-16 RX ADMIN — SIMETHICONE 80 MILLIGRAM(S): 80 TABLET, CHEWABLE ORAL at 06:54

## 2020-10-16 RX ADMIN — OXYCODONE HYDROCHLORIDE 5 MILLIGRAM(S): 5 TABLET ORAL at 18:28

## 2020-10-16 RX ADMIN — SIMETHICONE 80 MILLIGRAM(S): 80 TABLET, CHEWABLE ORAL at 18:29

## 2020-10-16 RX ADMIN — Medication 975 MILLIGRAM(S): at 06:45

## 2020-10-16 RX ADMIN — Medication 975 MILLIGRAM(S): at 13:23

## 2020-10-16 RX ADMIN — ENOXAPARIN SODIUM 40 MILLIGRAM(S): 100 INJECTION SUBCUTANEOUS at 21:04

## 2020-10-16 RX ADMIN — OXYCODONE HYDROCHLORIDE 5 MILLIGRAM(S): 5 TABLET ORAL at 19:28

## 2020-10-16 RX ADMIN — Medication 5 MILLIGRAM(S): at 21:03

## 2020-10-16 RX ADMIN — FAMOTIDINE 20 MILLIGRAM(S): 10 INJECTION INTRAVENOUS at 02:24

## 2020-10-16 RX ADMIN — Medication 975 MILLIGRAM(S): at 13:53

## 2020-10-16 NOTE — CHART NOTE - NSCHARTNOTEFT_GEN_A_CORE
Called to patient bedside for refusal of bloodwork. Explained to patient the importance of bloodwork, especially post-operatively. She expressed understanding but refused 2330 labs. Agreeable to 0430 lab draw.     Dr. Acharya aware. Dr. Hernandez to be made aware.

## 2020-10-16 NOTE — PROGRESS NOTE ADULT - SUBJECTIVE AND OBJECTIVE BOX
OBGYN PGY4 Note:     Patient seen and examined at bedside. Comfortable, denies severe abdominal pain, heavy vaginal bleeding/foul smelling vaginal discharge. Denies fever, chills, nausea/vomiting, diarrhea, dysuria, urgency, frequency, LE pain. Denies dizziness/lightheadedness/CP/SOB/palpitations.     Ambulating: yes   Voiding: yes   Diet: regular   Flatus: yes   Bowel movements: no   Breastfeeding      T(C): 36.8 (10-16-20 @ 03:49), Max: 37.2 (10-15-20 @ 07:00)  HR: 65 (10-16-20 @ 03:49) (65 - 105)  BP: 98/53 (10-16-20 @ 03:49) (90/51 - 112/63)  RR: 18 (10-16-20 @ 03:49) (18 - 18)  SpO2: 96% (10-15-20 @ 09:18) (91% - 100%)    Gen: NAD, AAO x3   Heart: RRR, S1S2+  Lungs: CTA B/L, no r/r/w   Abd: soft, nontender, no r/g/r; BS+   Incision: dressing changed; steris in place- c/d/i   Lochia: minimal   Ext: no edema/tenderness     Labs: AM CBC pending     MEDICATIONS  (STANDING):  acetaminophen    Suspension .. 975 milliGRAM(s) Oral every 6 hours  bisacodyl 5 milliGRAM(s) Oral at bedtime  enoxaparin Injectable 40 milliGRAM(s) SubCutaneous every 24 hours  ibuprofen  Suspension. 600 milliGRAM(s) Oral every 6 hours  influenza   Vaccine 0.5 milliLiter(s) IntraMuscular once  ketorolac   Injectable 30 milliGRAM(s) IV Push every 6 hours  lactated ringers. 1000 milliLiter(s) (125 mL/Hr) IV Continuous <Continuous>  oxytocin Infusion 333.333 milliUNIT(s)/Min (1000 mL/Hr) IV Continuous <Continuous>  simethicone 80 milliGRAM(s) Chew every 4 hours    MEDICATIONS  (PRN):  diphenhydrAMINE 25 milliGRAM(s) Oral every 6 hours PRN Itching  lanolin Ointment 1 Application(s) Topical every 6 hours PRN Sore Nipples  magnesium hydroxide Suspension 30 milliLiter(s) Oral two times a day PRN Constipation  oxyCODONE    IR 5 milliGRAM(s) Oral every 3 hours PRN Moderate to Severe Pain (4-10)  oxyCODONE    IR 5 milliGRAM(s) Oral once PRN Moderate to Severe Pain (4-10)

## 2020-10-16 NOTE — PROGRESS NOTE ADULT - ASSESSMENT
44yo P2 s/p urgent primary  section for double footling breech presentation in labor, ex24.6w; s/p celestone x2 doses; EBL 800cc; POD1; currently recovering well     - pain management with PO pain meds   - monitor vitals/bleeding   - encourage incentive spirometry   - pepcid for GI prophylaxis   - regular diet  - ambulate as tolerated    - encourage PO hydration   - f/u AM CBC   - s/p ancef for infection prophylaxis   - lovenox for DVT prophylaxis   - routine postop care   - anticipate d/c home tomorrow     Will inform Dr. Negron

## 2020-10-17 RX ORDER — IRON SUCROSE 20 MG/ML
100 INJECTION, SOLUTION INTRAVENOUS ONCE
Refills: 0 | Status: COMPLETED | OUTPATIENT
Start: 2020-10-17 | End: 2020-10-17

## 2020-10-17 RX ORDER — FERROUS SULFATE 325(65) MG
325 TABLET ORAL THREE TIMES A DAY
Refills: 0 | Status: DISCONTINUED | OUTPATIENT
Start: 2020-10-17 | End: 2020-10-19

## 2020-10-17 RX ADMIN — Medication 600 MILLIGRAM(S): at 06:36

## 2020-10-17 RX ADMIN — Medication 975 MILLIGRAM(S): at 16:26

## 2020-10-17 RX ADMIN — Medication 600 MILLIGRAM(S): at 18:11

## 2020-10-17 RX ADMIN — Medication 600 MILLIGRAM(S): at 12:07

## 2020-10-17 RX ADMIN — SIMETHICONE 80 MILLIGRAM(S): 80 TABLET, CHEWABLE ORAL at 17:39

## 2020-10-17 RX ADMIN — Medication 600 MILLIGRAM(S): at 17:41

## 2020-10-17 RX ADMIN — FAMOTIDINE 20 MILLIGRAM(S): 10 INJECTION INTRAVENOUS at 15:57

## 2020-10-17 RX ADMIN — Medication 600 MILLIGRAM(S): at 05:59

## 2020-10-17 RX ADMIN — Medication 975 MILLIGRAM(S): at 15:55

## 2020-10-17 RX ADMIN — SIMETHICONE 80 MILLIGRAM(S): 80 TABLET, CHEWABLE ORAL at 23:34

## 2020-10-17 RX ADMIN — SIMETHICONE 80 MILLIGRAM(S): 80 TABLET, CHEWABLE ORAL at 11:35

## 2020-10-17 RX ADMIN — FAMOTIDINE 20 MILLIGRAM(S): 10 INJECTION INTRAVENOUS at 05:59

## 2020-10-17 RX ADMIN — SIMETHICONE 80 MILLIGRAM(S): 80 TABLET, CHEWABLE ORAL at 05:58

## 2020-10-17 RX ADMIN — IRON SUCROSE 210 MILLIGRAM(S): 20 INJECTION, SOLUTION INTRAVENOUS at 20:43

## 2020-10-17 RX ADMIN — ENOXAPARIN SODIUM 40 MILLIGRAM(S): 100 INJECTION SUBCUTANEOUS at 20:42

## 2020-10-17 RX ADMIN — Medication 975 MILLIGRAM(S): at 23:33

## 2020-10-17 RX ADMIN — Medication 600 MILLIGRAM(S): at 11:37

## 2020-10-17 NOTE — PROGRESS NOTE ADULT - ASSESSMENT
44yo P2 s/p urgent primary LTCS with 1cm uterine vertical extension in midline, for double footling breech presentation in labor, @24.6w, POD#2, s/p celestone x2 doses, recovering well   - continue routine postpartum care  - s/p SW consult  - encourage ambulation  - lovenox for DVT prophylaxis   - encourage PO hydration   - f/u AM CBC   - s/p ancef for infection prophylaxis   - anticipate discharge home    Dr. Rosas and Dr. Hernandez to be made aware

## 2020-10-17 NOTE — PROGRESS NOTE ADULT - SUBJECTIVE AND OBJECTIVE BOX
CC: postpartum    HPI: Patient evaluated at bedside this morning on POD#2, resting comfortable in bed. She reports pain is well controlled. She has been ambulating without assistance, voiding spontaneously, passing gas, tolerating regular diet, and is breastfeeding. She has had a bowel movement. She denies headache, dizziness, chest pain, palpitations, shortness of breath, nausea, vomiting, or heavy vaginal bleeding.    Physical Exam:  Vital Signs Last 24 Hrs  T(C): 36.9 (17 Oct 2020 08:55), Max: 37 (16 Oct 2020 15:57)  T(F): 98.4 (17 Oct 2020 08:55), Max: 98.6 (16 Oct 2020 15:57)  HR: 72 (17 Oct 2020 08:55) (72 - 90)  BP: 107/58 (17 Oct 2020 08:55) (99/54 - 111/61)  RR: 19 (17 Oct 2020 08:55) (18 - 19)    GA: comfortable in NAD  CV: normal s1s2  Lungs: CTAB  Abd: BS +, soft, nontender, nondistended, no rebound or guarding, incision clean, dry and intact, uterus firm at midline, fundus below umbilicus  : lochia WNL  Extremities: no swelling or calf tenderness                             7.0    10.78 )-----------( 248      ( 16 Oct 2020 06:48 )             22.4

## 2020-10-17 NOTE — CHART NOTE - NSCHARTNOTEFT_GEN_A_CORE
Patient evaluated by GYN resident about refusal of blood draws. Patient's last Hg was 7.0, the plan was to repeat CBC and assess need for transfusion vs Iron supplements. Upon arrival to the room, I introduced myself as the OB-GYN resident, and proceeded to ask if she had in fact refused blood draws. The patient immediately became verbally abusive and charged at me demanding Iron supplements and accusing the OB-GYN team of poor medical care, recorded most of the conversation on her phone. She refused blood draws once again on night rounds. Will provide iron supplements.     Dr. Rosas, Dr. Hernandez aware

## 2020-10-18 ENCOUNTER — TRANSCRIPTION ENCOUNTER (OUTPATIENT)
Age: 45
End: 2020-10-18

## 2020-10-18 RX ADMIN — SIMETHICONE 80 MILLIGRAM(S): 80 TABLET, CHEWABLE ORAL at 18:48

## 2020-10-18 RX ADMIN — Medication 975 MILLIGRAM(S): at 21:34

## 2020-10-18 RX ADMIN — Medication 600 MILLIGRAM(S): at 18:46

## 2020-10-18 RX ADMIN — Medication 600 MILLIGRAM(S): at 07:20

## 2020-10-18 RX ADMIN — SIMETHICONE 80 MILLIGRAM(S): 80 TABLET, CHEWABLE ORAL at 10:00

## 2020-10-18 RX ADMIN — Medication 975 MILLIGRAM(S): at 09:20

## 2020-10-18 RX ADMIN — Medication 975 MILLIGRAM(S): at 10:00

## 2020-10-18 RX ADMIN — Medication 600 MILLIGRAM(S): at 12:59

## 2020-10-18 RX ADMIN — Medication 975 MILLIGRAM(S): at 00:10

## 2020-10-18 RX ADMIN — SIMETHICONE 80 MILLIGRAM(S): 80 TABLET, CHEWABLE ORAL at 06:24

## 2020-10-18 RX ADMIN — Medication 975 MILLIGRAM(S): at 17:30

## 2020-10-18 RX ADMIN — SIMETHICONE 80 MILLIGRAM(S): 80 TABLET, CHEWABLE ORAL at 23:29

## 2020-10-18 RX ADMIN — FAMOTIDINE 20 MILLIGRAM(S): 10 INJECTION INTRAVENOUS at 16:28

## 2020-10-18 RX ADMIN — Medication 975 MILLIGRAM(S): at 22:10

## 2020-10-18 RX ADMIN — Medication 975 MILLIGRAM(S): at 16:32

## 2020-10-18 RX ADMIN — Medication 600 MILLIGRAM(S): at 19:27

## 2020-10-18 RX ADMIN — Medication 600 MILLIGRAM(S): at 23:30

## 2020-10-18 RX ADMIN — Medication 600 MILLIGRAM(S): at 13:45

## 2020-10-18 RX ADMIN — SIMETHICONE 80 MILLIGRAM(S): 80 TABLET, CHEWABLE ORAL at 16:35

## 2020-10-18 RX ADMIN — Medication 600 MILLIGRAM(S): at 06:24

## 2020-10-18 RX ADMIN — ENOXAPARIN SODIUM 40 MILLIGRAM(S): 100 INJECTION SUBCUTANEOUS at 18:46

## 2020-10-18 NOTE — DISCHARGE NOTE OB - PATIENT PORTAL LINK FT
You can access the FollowMyHealth Patient Portal offered by [facility] by registering at the following website: [email or web address]. By joining [employer]’s FollowMyHealth portal, you will also be able to view your health information using other applications (apps) compatible with our system.

## 2020-10-18 NOTE — DISCHARGE NOTE OB - CARE PLAN
Principal Discharge DX:	Delivery by  section for footling breech presentation  Goal:	healthy mother  Assessment and plan of treatment:	Nothing in the vagina for 6 weeks (no sex, no tampons, no douching, no swimming pools). Avoid tub baths, you may shower. Please keep incision clean and dry.     If you have a fever of 100.4F or greater, severe vaginal bleeding, or severe abdominal pain, call your Ob/Gyn or come to the emergency department immediately.

## 2020-10-18 NOTE — DISCHARGE NOTE OB - MEDICATION SUMMARY - MEDICATIONS TO STOP TAKING
I will STOP taking the medications listed below when I get home from the hospital:    Bactrim  mg-160 mg oral tablet  -- 1 tab(s) by mouth 2 times a day   -- Avoid prolonged or excessive exposure to direct and/or artificial sunlight while taking this medication.  Finish all this medication unless otherwise directed by prescriber.  Medication should be taken with plenty of water.

## 2020-10-18 NOTE — DISCHARGE NOTE OB - MEDICATION SUMMARY - MEDICATIONS TO TAKE
I will START or STAY ON the medications listed below when I get home from the hospital:    ibuprofen 100 mg/5 mL oral suspension  -- 30 milliliter(s) by mouth every 6 hours, As Needed   -- Indication: For pain    acetaminophen 160 mg/5 mL oral suspension  -- 30.47 milliliter(s) by mouth every 6 hours, As Needed   -- Indication: For pain    simethicone 80 mg oral tablet, chewable  -- 1 tab(s) by mouth every 4 hours, As Needed   -- Indication: For gas pain

## 2020-10-18 NOTE — DISCHARGE NOTE OB - PLAN OF CARE
healthy mother Nothing in the vagina for 6 weeks (no sex, no tampons, no douching, no swimming pools). Avoid tub baths, you may shower. Please keep incision clean and dry.     If you have a fever of 100.4F or greater, severe vaginal bleeding, or severe abdominal pain, call your Ob/Gyn or come to the emergency department immediately.

## 2020-10-18 NOTE — PROGRESS NOTE ADULT - SUBJECTIVE AND OBJECTIVE BOX
MARLON SANDOVAL  45y  Female    PGY1 Note:    Pt is POD#3. Pt is recovering well, no acute complaints. Pt denies heavy vaginal bleeding, pain well controlled on PO medication. Patient is ambulating tolerating a regular diet, voiding, passing flatus, bottle feeding. Denies headache, chest pain, palpitations, SOB, fever, dizziness, lightheadedness, syncope, chills, nausea, vomiting, extermity pain or swelling.     PAST MEDICAL & SURGICAL HISTORY:  Anxiety    GERD (gastroesophageal reflux disease)    History of D&amp;C        Physical Exam  Vital Signs Last 24 Hrs  T(C): 35.7 (18 Oct 2020 07:35), Max: 36.7 (18 Oct 2020 00:00)  T(F): 96.3 (18 Oct 2020 07:35), Max: 98 (18 Oct 2020 00:00)  HR: 69 (18 Oct 2020 07:35) (67 - 80)  BP: 99/72 (18 Oct 2020 07:35) (99/72 - 118/65)  RR: 18 (18 Oct 2020 07:35) (18 - 18)    Gen: AAOx3, NAD  Heart: RRR, no M/R/G  Lungs: CTAB  Fundus: firm, below umbilicus   Wound: steris in place c/d/i   Abd: Soft, appropriately tender near incision site, mildly distended, BS+  Lochia: minimal   Ext: No calf tenderness, no swelling    Labs:                        7.0    10.78 )-----------( 248      ( 16 Oct 2020 06:48 )             22.4                         10.0   16.04 )-----------( 356      ( 14 Oct 2020 00:41 )             31.5      MEDICATIONS  (STANDING):  acetaminophen    Suspension .. 975 milliGRAM(s) Oral every 6 hours  bisacodyl Suppository 10 milliGRAM(s) Rectal at bedtime  enoxaparin Injectable 40 milliGRAM(s) SubCutaneous every 24 hours  famotidine Injectable 20 milliGRAM(s) IV Push every 12 hours  ferrous    sulfate 325 milliGRAM(s) Oral three times a day  ibuprofen  Suspension. 600 milliGRAM(s) Oral every 6 hours  oxytocin Infusion 333.333 milliUNIT(s)/Min (1000 mL/Hr) IV Continuous <Continuous>  simethicone 80 milliGRAM(s) Chew every 4 hours    MEDICATIONS  (PRN):  diphenhydrAMINE 25 milliGRAM(s) Oral every 6 hours PRN Itching  lanolin Ointment 1 Application(s) Topical every 6 hours PRN Sore Nipples  magnesium hydroxide Suspension 30 milliLiter(s) Oral two times a day PRN Constipation  oxyCODONE    IR 5 milliGRAM(s) Oral once PRN Moderate to Severe Pain (4-10)  oxyCODONE    IR 5 milliGRAM(s) Oral every 6 hours PRN Severe Pain (7 - 10)  oxyCODONE    IR 5 milliGRAM(s) Oral every 3 hours PRN Moderate to Severe Pain (4-10)       MARLON SANDOVAL  45y  Female    PGY1 Note:    Pt is POD#3. Pt is recovering well, no acute complaints. Pt denies heavy vaginal bleeding, pain well controlled on PO medication. Patient is ambulating tolerating a regular diet, voiding, passing flatus, bottle feeding. Denies headache, chest pain, palpitations, SOB, fever, dizziness, lightheadedness, syncope, chills, nausea, vomiting, extermity pain or swelling.     PAST MEDICAL & SURGICAL HISTORY:  Anxiety    GERD (gastroesophageal reflux disease)    History of D&C        Physical Exam  Vital Signs Last 24 Hrs  T(C): 35.7 (18 Oct 2020 07:35), Max: 36.7 (18 Oct 2020 00:00)  T(F): 96.3 (18 Oct 2020 07:35), Max: 98 (18 Oct 2020 00:00)  HR: 69 (18 Oct 2020 07:35) (67 - 80)  BP: 99/72 (18 Oct 2020 07:35) (99/72 - 118/65)  RR: 18 (18 Oct 2020 07:35) (18 - 18)    Gen: AAOx3, NAD  Heart: RRR, no M/R/G  Lungs: CTAB  Fundus: firm, below umbilicus   Wound: steris in place c/d/i over pfannenstiel skin incision  Abd: Soft, appropriately tender near incision site, mildly distended, BS+  Lochia: minimal   Ext: No calf tenderness, no swelling    Labs:                        7.0    10.78 )-----------( 248      ( 16 Oct 2020 06:48 )             22.4                         10.0   16.04 )-----------( 356      ( 14 Oct 2020 00:41 )             31.5      MEDICATIONS  (STANDING):  acetaminophen    Suspension .. 975 milliGRAM(s) Oral every 6 hours  bisacodyl Suppository 10 milliGRAM(s) Rectal at bedtime  enoxaparin Injectable 40 milliGRAM(s) SubCutaneous every 24 hours  famotidine Injectable 20 milliGRAM(s) IV Push every 12 hours  ferrous    sulfate 325 milliGRAM(s) Oral three times a day  ibuprofen  Suspension. 600 milliGRAM(s) Oral every 6 hours  oxytocin Infusion 333.333 milliUNIT(s)/Min (1000 mL/Hr) IV Continuous <Continuous>  simethicone 80 milliGRAM(s) Chew every 4 hours    MEDICATIONS  (PRN):  diphenhydrAMINE 25 milliGRAM(s) Oral every 6 hours PRN Itching  lanolin Ointment 1 Application(s) Topical every 6 hours PRN Sore Nipples  magnesium hydroxide Suspension 30 milliLiter(s) Oral two times a day PRN Constipation  oxyCODONE    IR 5 milliGRAM(s) Oral once PRN Moderate to Severe Pain (4-10)  oxyCODONE    IR 5 milliGRAM(s) Oral every 6 hours PRN Severe Pain (7 - 10)  oxyCODONE    IR 5 milliGRAM(s) Oral every 3 hours PRN Moderate to Severe Pain (4-10)

## 2020-10-18 NOTE — DISCHARGE NOTE OB - CARE PROVIDER_API CALL
Brittney Sanchez  Obstetrics and Gynecology  [address] [place] [address]  Phone: [phone]  Fax: [phone]  Established Patient  Follow Up Time: 1 week

## 2020-10-18 NOTE — PROGRESS NOTE ADULT - ASSESSMENT
46yo P2 s/p urgent primary LTCS with 1cm uterine vertical extension in midline, for double footling breech presentation in labor, @24.6w, POD#2, s/p celestone x2 doses, with drop in h/h, declining labs,    - continue routine postpartum care  - s/p SW consult  - encourage ambulation  - lovenox for DVT prophylaxis   - encourage PO hydration    -pain management PRN  -patient desires to stay another day for the baby    Dr. Wilkins and Dr. Coates to be made aware 44yo P2 s/p urgent primary LTCS with 1cm uterine vertical extension in midline, for double footling breech presentation in labor, @24.6w, POD#3, s/p celestone x2 doses, with drop in h/h, declining labs,    - continue routine postpartum care  - s/p SW consult  - encourage ambulation  - lovenox for DVT prophylaxis   - encourage PO hydration    -pain management PRN  -patient desires to stay another day for the baby    Dr. Wilkins and Dr. Coatse to be made aware 46yo P2 s/p urgent primary LTCS with 1cm uterine vertical extension in midline, for double footling breech presentation in labor, @24.6w, POD#3, s/p celestone x2 doses, with drop in h/h, declining blood draws,    - continue routine postpartum care  - s/p SW consult  - encourage ambulation  - lovenox for DVT prophylaxis   - encourage PO hydration    -pain management PRN  -patient desires to stay another day, anticipate discharge tomorrow.     Dr. Wilkins and Dr. Coates aware

## 2020-10-18 NOTE — DISCHARGE NOTE OB - HOSPITAL COURSE
Admitted for PPROM and cervical dilation (5cm); pt received latency antibiotics, Mg for neuroprotection, steroids for fetal lung maturity; taken for urgent C/S in view of footling breech presentation with feet in vagina. Postoperatively, received ancef x24hr; H/H 7/22.4; but unable to assess whether stable as pt refused further blood draws. Pt asymptomatic, s/p IV venofer x1; voiding, ambulating, tolerating regular diet; hence d/tyrone home.

## 2020-10-19 RX ORDER — FERROUS SULFATE 325(65) MG
300 TABLET ORAL
Refills: 0 | Status: DISCONTINUED | OUTPATIENT
Start: 2020-10-19 | End: 2020-10-20

## 2020-10-19 RX ADMIN — FAMOTIDINE 20 MILLIGRAM(S): 10 INJECTION INTRAVENOUS at 14:15

## 2020-10-19 RX ADMIN — SIMETHICONE 80 MILLIGRAM(S): 80 TABLET, CHEWABLE ORAL at 12:54

## 2020-10-19 RX ADMIN — Medication 600 MILLIGRAM(S): at 06:23

## 2020-10-19 RX ADMIN — Medication 600 MILLIGRAM(S): at 00:11

## 2020-10-19 RX ADMIN — Medication 600 MILLIGRAM(S): at 06:55

## 2020-10-19 RX ADMIN — Medication 975 MILLIGRAM(S): at 22:01

## 2020-10-19 RX ADMIN — Medication 975 MILLIGRAM(S): at 15:20

## 2020-10-19 RX ADMIN — Medication 300 MILLIGRAM(S): at 18:18

## 2020-10-19 RX ADMIN — Medication 600 MILLIGRAM(S): at 14:14

## 2020-10-19 RX ADMIN — Medication 975 MILLIGRAM(S): at 23:01

## 2020-10-19 RX ADMIN — Medication 600 MILLIGRAM(S): at 18:19

## 2020-10-19 RX ADMIN — SIMETHICONE 80 MILLIGRAM(S): 80 TABLET, CHEWABLE ORAL at 06:22

## 2020-10-19 RX ADMIN — Medication 975 MILLIGRAM(S): at 14:57

## 2020-10-19 RX ADMIN — ENOXAPARIN SODIUM 40 MILLIGRAM(S): 100 INJECTION SUBCUTANEOUS at 18:34

## 2020-10-19 RX ADMIN — SIMETHICONE 80 MILLIGRAM(S): 80 TABLET, CHEWABLE ORAL at 18:18

## 2020-10-19 RX ADMIN — Medication 600 MILLIGRAM(S): at 18:45

## 2020-10-19 RX ADMIN — Medication 600 MILLIGRAM(S): at 12:55

## 2020-10-19 NOTE — PROGRESS NOTE ADULT - ASSESSMENT
44yo P2 s/p urgent primary  section for double footling breech presentation in labor, ex24.6w; s/p celestone x2 doses; EBL 800cc; POD4; currently recovering well     - pain management with PO pain meds   - monitor vitals/bleeding   - encourage incentive spirometry   - pepcid for GI prophylaxis   - regular diet  - ambulate as tolerated    - encourage PO hydration   - s/p ancef for infection prophylaxis   - lovenox for DVT prophylaxis   - routine postop care   - anticipate d/c home today or tomorrow     Will inform Dr. Negron

## 2020-10-19 NOTE — PROGRESS NOTE ADULT - SUBJECTIVE AND OBJECTIVE BOX
MARLON SANDOVAL  45y  Female    PGY4 Note:    Pt recovering well, no acute complaints.  Denies severe abdominal pain, heavy vaginal bleeding/foul smelling vaginal discharge. Denies fever, chills, nausea/vomiting, diarrhea, dysuria, urgency, frequency, LE pain. Denies dizziness/lightheadedness/CP/SOB/palpitations.     Ambulating: Yes  Voiding: Yes  Flatus: Yes  Bowel movements: Yes   Breast or bottle feeding: Breastfeeding   Diet: Regular    Physical Exam  Vital Signs Last 24 Hrs  T(C): 36.7 (18 Oct 2020 23:30), Max: 36.8 (18 Oct 2020 15:30)  T(F): 98 (18 Oct 2020 23:30), Max: 98.3 (18 Oct 2020 15:30)  HR: 66 (18 Oct 2020 23:30) (66 - 80)  BP: 108/53 (18 Oct 2020 23:30) (99/72 - 123/61)  RR: 18 (18 Oct 2020 23:30) (18 - 18)    Gen: AAOx3, NAD  Heart: RRR, S1S2+  Lungs: CTA B/L, no r/r/w   Fundus: firm, below umbilicus   Wound: steris in place c/d/i   Abd: Soft, nontender, nondistended, BS+   Lochia: minimal   Ext: No calf tenderness, no swelling    Labs:                        7.0    10.78 )-----------( 248      ( 16 Oct 2020 06:48 )             22.4                         10.0   16.04 )-----------( 356      ( 14 Oct 2020 00:41 )             31.5        MEDICATIONS  (STANDING):  acetaminophen    Suspension .. 975 milliGRAM(s) Oral every 6 hours  bisacodyl Suppository 10 milliGRAM(s) Rectal at bedtime  enoxaparin Injectable 40 milliGRAM(s) SubCutaneous every 24 hours  famotidine Injectable 20 milliGRAM(s) IV Push every 12 hours  ferrous    sulfate 325 milliGRAM(s) Oral three times a day  ibuprofen  Suspension. 600 milliGRAM(s) Oral every 6 hours  oxytocin Infusion 333.333 milliUNIT(s)/Min (1000 mL/Hr) IV Continuous <Continuous>  simethicone 80 milliGRAM(s) Chew every 4 hours    MEDICATIONS  (PRN):  diphenhydrAMINE 25 milliGRAM(s) Oral every 6 hours PRN Itching  lanolin Ointment 1 Application(s) Topical every 6 hours PRN Sore Nipples  magnesium hydroxide Suspension 30 milliLiter(s) Oral two times a day PRN Constipation  oxyCODONE    IR 5 milliGRAM(s) Oral once PRN Moderate to Severe Pain (4-10)  oxyCODONE    IR 5 milliGRAM(s) Oral every 6 hours PRN Severe Pain (7 - 10)  oxyCODONE    IR 5 milliGRAM(s) Oral every 3 hours PRN Moderate to Severe Pain (4-10)

## 2020-10-20 VITALS
HEART RATE: 63 BPM | SYSTOLIC BLOOD PRESSURE: 113 MMHG | TEMPERATURE: 98 F | RESPIRATION RATE: 18 BRPM | DIASTOLIC BLOOD PRESSURE: 58 MMHG

## 2020-10-20 LAB — SURGICAL PATHOLOGY STUDY: SIGNIFICANT CHANGE UP

## 2020-10-20 RX ORDER — SIMETHICONE 80 MG/1
1 TABLET, CHEWABLE ORAL
Qty: 30 | Refills: 0
Start: 2020-10-20 | End: 2020-10-24

## 2020-10-20 RX ORDER — IBUPROFEN 200 MG
30 TABLET ORAL
Qty: 1200 | Refills: 0
Start: 2020-10-20 | End: 2020-10-29

## 2020-10-20 RX ORDER — ACETAMINOPHEN 500 MG
30.47 TABLET ORAL
Qty: 365.64 | Refills: 0
Start: 2020-10-20 | End: 2020-10-22

## 2020-10-20 RX ADMIN — Medication 975 MILLIGRAM(S): at 09:16

## 2020-10-20 RX ADMIN — Medication 975 MILLIGRAM(S): at 09:51

## 2020-10-20 RX ADMIN — Medication 300 MILLIGRAM(S): at 09:16

## 2020-10-20 RX ADMIN — SIMETHICONE 80 MILLIGRAM(S): 80 TABLET, CHEWABLE ORAL at 06:33

## 2020-10-20 RX ADMIN — Medication 600 MILLIGRAM(S): at 06:34

## 2020-10-20 NOTE — PROGRESS NOTE ADULT - ASSESSMENT
46yo P2 s/p urgent primary  section for double footling breech presentation in labor, ex24.6w; s/p celestone x2 doses; EBL 800cc; POD#5, currently recovering well   - ammenable to discharge today, anticipate discharge today   - s/p ancef for infection prophylaxis   - routine postop care     Dr. Coates aware

## 2020-10-20 NOTE — PROGRESS NOTE ADULT - SUBJECTIVE AND OBJECTIVE BOX
MARLON LORI  45y  Female    PGY-3 Note:  Patient seen and examined bedside. No overnight events. Denies heavy vaginal bleeding and reports pain well controlled. Ambulating without difficulty. Tolerating Diet. Reports +flatus, + bowel movement. Breastpumping.  Denies CP, SOB, RUQ pain/ Epigastric pain, N/V, LE pain/ swelling, diarrhea, pain/difficulty with urination, fevers, chills       MEDICATIONS  (STANDING):  acetaminophen    Suspension .. 975 milliGRAM(s) Oral every 6 hours  bisacodyl Suppository 10 milliGRAM(s) Rectal at bedtime  enoxaparin Injectable 40 milliGRAM(s) SubCutaneous every 24 hours  famotidine Injectable 20 milliGRAM(s) IV Push every 12 hours  ferrous    sulfate Liquid 300 milliGRAM(s) Oral two times a day with meals  ibuprofen  Suspension. 600 milliGRAM(s) Oral every 6 hours  oxytocin Infusion 333.333 milliUNIT(s)/Min (1000 mL/Hr) IV Continuous <Continuous>  simethicone 80 milliGRAM(s) Chew every 4 hours    MEDICATIONS  (PRN):  diphenhydrAMINE 25 milliGRAM(s) Oral every 6 hours PRN Itching  lanolin Ointment 1 Application(s) Topical every 6 hours PRN Sore Nipples  magnesium hydroxide Suspension 30 milliLiter(s) Oral two times a day PRN Constipation  oxyCODONE    IR 5 milliGRAM(s) Oral once PRN Moderate to Severe Pain (4-10)  oxyCODONE    IR 5 milliGRAM(s) Oral every 6 hours PRN Severe Pain (7 - 10)  oxyCODONE    IR 5 milliGRAM(s) Oral every 3 hours PRN Moderate to Severe Pain (4-10)      PAST MEDICAL & SURGICAL HISTORY:  Anxiety    GERD (gastroesophageal reflux disease)    History of D&amp;C        Physical Exam  Vital Signs Last 24 Hrs  T(C): 36.6 (20 Oct 2020 07:38), Max: 36.9 (19 Oct 2020 15:30)  T(F): 97.9 (20 Oct 2020 07:38), Max: 98.5 (19 Oct 2020 15:30)  HR: 63 (20 Oct 2020 07:38) (63 - 71)  BP: 113/58 (20 Oct 2020 07:38) (113/58 - 134/61)  RR: 18 (20 Oct 2020 07:38) (18 - 20)    Gen: AAOx3, NAD  CV: RRR. No murmors gallops or rubs.  Pulm: CTAB. No wheezes or rales.  Ext: No calf tenderness, no swelling.   Abd: Soft, nontender, nondistended, BS+  Fundus firm, and below umbilicus. Nontender.   Lochia: Minimal, rubria  Wound: incision clean, dry intact. Steris in place. No surrounding edema or erythema.       Labs:                        7.0    10.78 )-----------( 248      ( 16 Oct 2020 06:48 )             22.4                         10.0   16.04 )-----------( 356      ( 14 Oct 2020 00:41 )             31.5

## 2020-11-09 ENCOUNTER — APPOINTMENT (OUTPATIENT)
Dept: ANTEPARTUM | Facility: CLINIC | Age: 45
End: 2020-11-09

## 2020-12-01 ENCOUNTER — OUTPATIENT (OUTPATIENT)
Dept: OUTPATIENT SERVICES | Facility: HOSPITAL | Age: 45
LOS: 1 days | Discharge: HOME | End: 2020-12-01

## 2020-12-01 DIAGNOSIS — Z98.890 OTHER SPECIFIED POSTPROCEDURAL STATES: Chronic | ICD-10-CM

## 2020-12-01 DIAGNOSIS — Z11.59 ENCOUNTER FOR SCREENING FOR OTHER VIRAL DISEASES: ICD-10-CM

## 2020-12-16 PROBLEM — N39.0 UTI (URINARY TRACT INFECTION), BACTERIAL: Status: RESOLVED | Noted: 2018-03-27 | Resolved: 2020-12-16

## 2021-02-13 ENCOUNTER — TRANSCRIPTION ENCOUNTER (OUTPATIENT)
Age: 46
End: 2021-02-13

## 2021-08-04 NOTE — OB RN TRIAGE NOTE - NS PRO ABUSE SCREEN SUSPICION NEGLECT YN
Chief Complaint   Patient presents with    Physical     Ptt is here for a physical      Visit Vitals  BP (!) 144/81 (BP 1 Location: Right arm, BP Patient Position: At rest, BP Cuff Size: Adult)   Pulse 96   Temp 97.5 °F (36.4 °C) (Temporal)   Resp 18   Ht 5' 7\" (1.702 m)   Wt 247 lb 6.4 oz (112.2 kg)   SpO2 98%   BMI 38.75 kg/m²     1. Have you been to the ER, urgent care clinic since your last visit? Hospitalized since your last visit?no    2. Have you seen or consulted any other health care providers outside of the [address] [place] [address] since your last visit? Include any pap smears or colon screening.  no no

## 2021-08-24 ENCOUNTER — OUTPATIENT (OUTPATIENT)
Dept: OUTPATIENT SERVICES | Facility: HOSPITAL | Age: 46
LOS: 1 days | Discharge: HOME | End: 2021-08-24
Payer: MEDICAID

## 2021-08-24 DIAGNOSIS — Z98.890 OTHER SPECIFIED POSTPROCEDURAL STATES: Chronic | ICD-10-CM

## 2021-08-24 DIAGNOSIS — R10.2 PELVIC AND PERINEAL PAIN: ICD-10-CM

## 2021-08-24 DIAGNOSIS — N93.9 ABNORMAL UTERINE AND VAGINAL BLEEDING, UNSPECIFIED: ICD-10-CM

## 2021-08-24 PROCEDURE — 76830 TRANSVAGINAL US NON-OB: CPT | Mod: 26

## 2021-08-24 PROCEDURE — 76856 US EXAM PELVIC COMPLETE: CPT | Mod: 26

## 2021-10-14 ENCOUNTER — TRANSCRIPTION ENCOUNTER (OUTPATIENT)
Age: 46
End: 2021-10-14

## 2021-10-24 ENCOUNTER — TRANSCRIPTION ENCOUNTER (OUTPATIENT)
Age: 46
End: 2021-10-24

## 2021-12-09 NOTE — OB PROVIDER TRIAGE NOTE - NSINFECTIONS_OBGYN_ALL_OB
Rhofade Pregnancy And Lactation Text: This medication has not been assigned a Pregnancy Risk Category. It is unknown if the medication is excreted in breast milk. Unknown at this time

## 2022-03-12 ENCOUNTER — EMERGENCY (EMERGENCY)
Facility: HOSPITAL | Age: 47
LOS: 0 days | Discharge: HOME | End: 2022-03-13
Attending: STUDENT IN AN ORGANIZED HEALTH CARE EDUCATION/TRAINING PROGRAM | Admitting: STUDENT IN AN ORGANIZED HEALTH CARE EDUCATION/TRAINING PROGRAM
Payer: MEDICAID

## 2022-03-12 VITALS
WEIGHT: 134.92 LBS | TEMPERATURE: 98 F | RESPIRATION RATE: 18 BRPM | HEIGHT: 60 IN | OXYGEN SATURATION: 99 % | HEART RATE: 84 BPM | DIASTOLIC BLOOD PRESSURE: 58 MMHG | SYSTOLIC BLOOD PRESSURE: 120 MMHG

## 2022-03-12 DIAGNOSIS — S61.215A LACERATION WITHOUT FOREIGN BODY OF LEFT RING FINGER WITHOUT DAMAGE TO NAIL, INITIAL ENCOUNTER: ICD-10-CM
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
## 2022-03-12 DIAGNOSIS — Z88.0 ALLERGY STATUS TO PENICILLIN: ICD-10-CM
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
## 2022-03-12 DIAGNOSIS — Z98.890 OTHER SPECIFIED POSTPROCEDURAL STATES: Chronic | ICD-10-CM
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
## 2022-03-12 PROCEDURE — 99283 EMERGENCY DEPT VISIT LOW MDM: CPT | Mod: 25

## 2022-03-12 PROCEDURE — 12001 RPR S/N/AX/GEN/TRNK 2.5CM/<: CPT

## 2022-03-12 RX ORDER — TETANUS TOXOID, REDUCED DIPHTHERIA TOXOID AND ACELLULAR PERTUSSIS VACCINE, ADSORBED 5; 2.5; 8; 8; 2.5 [IU]/.5ML; [IU]/.5ML; UG/.5ML; UG/.5ML; UG/.5ML
0.5 SUSPENSION INTRAMUSCULAR ONCE
Refills: 0 | Status: COMPLETED | OUTPATIENT
Start: 2022-03-12 | End: 2022-03-12

## 2022-03-12 NOTE — ED PROVIDER NOTE - ATTENDING CONTRIBUTION TO CARE
46 year old female presents here for a laceration to her left 4th & 5th digit after cutting it with a knife while she was opening a pack of chicken. No other injuries no numbness/tingling. Tetanas unknown.  on exam  CONSTITUTIONAL: WA / WN / NAD  HEAD: NCAT  EYES: PERRL; EOMI;   NECK: Supple;   MSK/EXT: No gross deformities; full range of motion. +1.5cm laceration to left 4th digit and 1cm laceration to left 5th digit. Sensation in tact full ROM  NEURO: AAOx3  PSYCH: Memory Intact, Normal Affect

## 2022-03-12 NOTE — ED PROVIDER NOTE - OBJECTIVE STATEMENT
47 yo female with no pertinent pmh presents c/o laceration to her L ring finger. pt states she cut herself with a knife while cooking. pt unaware of last tetanus shot. pt denies any other symptoms including fevers, chill, headache, recent illness/travel, cough, abdominal pain, chest pain, or SOB.

## 2022-03-12 NOTE — ED PROVIDER NOTE - CLINICAL SUMMARY MEDICAL DECISION MAKING FREE TEXT BOX
46 year old female presents here for a laceration to her left 4th & 5th digit after cutting it with a knife while she was opening a pack of chicken. No other injuries no numbness/tingling. VS reviewed tetanus updated pain medication provided, laceration repaired with 4 sutures. Return precautions given.

## 2022-03-12 NOTE — ED PROVIDER NOTE - NSFOLLOWUPINSTRUCTIONS_ED_ALL_ED_FT
Please follow up with your primary care physician within 24-72 hours and return immediately if symptoms worsen. Please return in 10 days for suture removal.       Laceration Care, Adult      A laceration is a cut that may go through all layers of the skin. The cut may also go into the tissue that is right under the skin. Some cuts heal on their own. Others need to be closed with stitches (sutures), staples, skin adhesive strips, or skin glue. Taking care of your injury lowers your risk of infection, helps your injury to heal better, and may prevent scarring.      Supplies needed:    •Soap.      •Water.      •Hand .      •Bandage (dressing).      •Antibiotic ointment.      •Clean towel.        How to take care of your cut    Wash your hands with soap and water before touching your wound or changing your bandage. If soap and water are not available, use hand .    If your doctor used stitches or staples:     •Keep the wound clean and dry.      •If you were given a bandage, change it at least once a day as told by your doctor. You should also change it if it gets wet or dirty.      •Keep the wound completely dry for the first 24 hours, or as told by your doctor. After that, you may take a shower or a bath. Do not get the wound soaked in water until after the stitches or staples have been removed.    •Clean the wound once a day, or as told by your doctor:  •Wash the wound with soap and water.      •Rinse the wound with water to remove all soap.      •Pat the wound dry with a clean towel. Do not rub the wound.      •After you clean the wound, put a thin layer of antibiotic ointment on it as told by your doctor. This ointment:  •Helps to prevent infection.      •Keeps the bandage from sticking to the wound.        •Have your stitches or staples removed as told by your doctor.      If your doctor used skin adhesive strips:     •Keep the wound clean and dry.      •If you were given a bandage, you should change it at least once a day as told by your doctor. You should also change it if it gets wet or dirty.      • Do not get the skin adhesive strips wet. You can take a shower or a bath, but keep the wound dry.      •If the wound gets wet, pat it dry with a clean towel. Do not rub the wound.      •Skin adhesive strips fall off on their own. You can trim the strips as the wound heals. Do not remove any strips that are still stuck to the wound. They will fall off after a while.      If your doctor used skin glue:     •Try to keep your wound dry, but you may briefly wet it in the shower or bath. Do not soak the wound in water, such as by swimming.      •After you take a shower or a bath, gently pat the wound dry with a clean towel. Do not rub the wound.      • Do not do any activities that will make you really sweaty until the skin glue has fallen off on its own.      • Do not apply liquid, cream, or ointment medicine to your wound while the skin glue is still on.      •If you were given a bandage, you should change it at least once a day or as told by your doctor. You should also change it if it gets dirty or wet.      •If a bandage is placed over the wound, do not let the tape touch the skin glue.      • Do not pick at the glue. The skin glue usually stays on for 5–10 days. Then, it falls off the skin.        General instructions      •Take over-the-counter and prescription medicines only as told by your doctor.      •If you were given antibiotic medicine or ointment, take or apply it as told by your doctor. Do not stop using it even if your condition improves.      • Do not scratch or pick at the wound.    •Check your wound every day for signs of infection. Watch for:  •Redness, swelling, or pain.      •Fluid, blood, or pus.        •Raise (elevate) the injured area above the level of your heart while you are sitting or lying down.    •If directed, put ice on the affected area:  •Put ice in a plastic bag.      •Place a towel between your skin and the bag.      •Leave the ice on for 20 minutes, 2–3 times a day.        •Prevent scarring by covering your wound with sunscreen of at least 30 SPF whenever you are outside after your wound has healed.      •Keep all follow-up visits as told by your doctor. This is important.        Get help if:  •You got a tetanus shot and you have any of these problems at the injection site:  •Swelling.      •Very bad pain.      •Redness.      •Bleeding.        •You have a fever.      •A wound that was closed breaks open.      •You notice a bad smell coming from your wound or your bandage.      •You notice something coming out of the wound, such as wood or glass.      •Medicine does not relieve your pain.      •You have more redness, swelling, or pain at the site of your wound.      •You have fluid, blood, or pus coming from your wound.      •You notice a change in the color of your skin near your wound.      •You need to change the bandage often because fluid, blood, or pus is coming from the wound.      •You start to have a new rash.      •You start to have numbness around the wound.        Get help right away if:    •You have very bad swelling around the wound.      •Your pain suddenly gets worse and is very bad.      •You notice painful lumps near the wound or anywhere on your body.      •You have a red streak going away from your wound.    •The wound is on your hand or foot, and:  •You cannot move a finger or toe.      •Your fingers or toes look pale or bluish.          Summary    •A laceration is a cut that may go through all layers of the skin. The cut may also go into the tissue right under the skin.      •Some cuts heal on their own. Others need to be closed with stitches, staples, skin adhesive strips, or skin glue.      •Follow your doctor's instructions for caring for your cut. Proper care of a cut lowers the risk of infection, helps the cut heal better, and prevents scarring.      This information is not intended to replace advice given to you by your health care provider. Make sure you discuss any questions you have with your health care provider.

## 2022-03-12 NOTE — ED PROVIDER NOTE - NS ED ROS FT
Constitutional: (-) fever  Eyes/ENT: (-) visual changes   Cardiovascular: (-) chest pain, (-) syncope  Respiratory: (-) cough, (-) shortness of breath  Gastrointestinal: (-) vomiting, (-) diarrhea  Genitourinary: (-) dysuria, (-) hesitancy, (-) frequency   Musculoskeletal: (-) neck pain, (-) back pain, (-) joint pain  Integumentary: (-) rash, (-) edema, laceration   Neurological: (-) headache, (-) altered mental status  Allergic/Immunologic: (-) pruritus

## 2022-03-12 NOTE — ED PROVIDER NOTE - PHYSICAL EXAMINATION
Gen: NAD, AOx3  Head: NCAT  HEENT: PERRL, oral mucosa moist, normal conjunctiva, oropharynx clear without exudate or erythema  Lung: CTAB, no respiratory distress, no wheezing, rales, rhonchi  CV: normal s1/s2, rrr, Normal perfusion, pulses 2+ throughout  MSK: No edema, no visible deformities, full range of motion in all 4 extremities  Neuro: CN II-XII grossly intact, No focal neurologic deficits  Skin: No rash, laceration to L 4th digit   Psych: normal affect

## 2022-03-12 NOTE — ED PROVIDER NOTE - PATIENT PORTAL LINK FT
You can access the FollowMyHealth Patient Portal offered by [facility] by registering at the following website: [email or web address]. By joining [employer]’s FollowMyHealth portal, you will also be able to view your health information using other applications (apps) compatible with our system.

## 2022-03-13 RX ORDER — IBUPROFEN 200 MG
600 TABLET ORAL ONCE
Refills: 0 | Status: COMPLETED | OUTPATIENT
Start: 2022-03-13 | End: 2022-03-13

## 2022-03-13 RX ADMIN — Medication 600 MILLIGRAM(S): at 01:07

## 2022-03-25 ENCOUNTER — EMERGENCY (EMERGENCY)
Facility: HOSPITAL | Age: 47
LOS: 0 days | Discharge: HOME | End: 2022-03-25
Attending: STUDENT IN AN ORGANIZED HEALTH CARE EDUCATION/TRAINING PROGRAM | Admitting: STUDENT IN AN ORGANIZED HEALTH CARE EDUCATION/TRAINING PROGRAM
Payer: MEDICAID

## 2022-03-25 VITALS
RESPIRATION RATE: 18 BRPM | SYSTOLIC BLOOD PRESSURE: 125 MMHG | DIASTOLIC BLOOD PRESSURE: 60 MMHG | HEIGHT: 60 IN | TEMPERATURE: 98 F | OXYGEN SATURATION: 98 % | HEART RATE: 80 BPM

## 2022-03-25 DIAGNOSIS — K21.9 GASTRO-ESOPHAGEAL REFLUX DISEASE WITHOUT ESOPHAGITIS: ICD-10-CM

## 2022-03-25 DIAGNOSIS — M79.89 OTHER SPECIFIED SOFT TISSUE DISORDERS: ICD-10-CM

## 2022-03-25 DIAGNOSIS — M79.645 PAIN IN LEFT FINGER(S): ICD-10-CM

## 2022-03-25 DIAGNOSIS — T81.49XA INFECTION FOLLOWING A PROCEDURE, OTHER SURGICAL SITE, INITIAL ENCOUNTER: ICD-10-CM

## 2022-03-25 DIAGNOSIS — F41.9 ANXIETY DISORDER, UNSPECIFIED: ICD-10-CM
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
## 2022-03-25 DIAGNOSIS — Z88.0 ALLERGY STATUS TO PENICILLIN: ICD-10-CM

## 2022-03-25 DIAGNOSIS — Z98.890 OTHER SPECIFIED POSTPROCEDURAL STATES: Chronic | ICD-10-CM

## 2022-03-25 LAB
ANION GAP SERPL CALC-SCNC: 10 MMOL/L — SIGNIFICANT CHANGE UP (ref 7–14)
BASOPHILS # BLD AUTO: 0.03 K/UL — SIGNIFICANT CHANGE UP (ref 0–0.2)
BASOPHILS NFR BLD AUTO: 0.3 % — SIGNIFICANT CHANGE UP (ref 0–1)
BUN SERPL-MCNC: 14 MG/DL — SIGNIFICANT CHANGE UP (ref 10–20)
CALCIUM SERPL-MCNC: 9.5 MG/DL — SIGNIFICANT CHANGE UP (ref 8.5–10.1)
CHLORIDE SERPL-SCNC: 102 MMOL/L — SIGNIFICANT CHANGE UP (ref 98–110)
CO2 SERPL-SCNC: 26 MMOL/L — SIGNIFICANT CHANGE UP (ref 17–32)
CREAT SERPL-MCNC: 0.6 MG/DL — LOW (ref 0.7–1.5)
EGFR: 113 ML/MIN/1.73M2 — SIGNIFICANT CHANGE UP
EOSINOPHIL # BLD AUTO: 0.49 K/UL — SIGNIFICANT CHANGE UP (ref 0–0.7)
EOSINOPHIL NFR BLD AUTO: 4.9 % — SIGNIFICANT CHANGE UP (ref 0–8)
ERYTHROCYTE [SEDIMENTATION RATE] IN BLOOD: 25 MM/HR — HIGH (ref 0–20)
GLUCOSE SERPL-MCNC: 88 MG/DL — SIGNIFICANT CHANGE UP (ref 70–99)
HCT VFR BLD CALC: 34.5 % — LOW (ref 37–47)
HGB BLD-MCNC: 11 G/DL — LOW (ref 12–16)
IMM GRANULOCYTES NFR BLD AUTO: 0.4 % — HIGH (ref 0.1–0.3)
LYMPHOCYTES # BLD AUTO: 3.24 K/UL — SIGNIFICANT CHANGE UP (ref 1.2–3.4)
LYMPHOCYTES # BLD AUTO: 32.2 % — SIGNIFICANT CHANGE UP (ref 20.5–51.1)
MCHC RBC-ENTMCNC: 28.2 PG — SIGNIFICANT CHANGE UP (ref 27–31)
MCHC RBC-ENTMCNC: 31.9 G/DL — LOW (ref 32–37)
MCV RBC AUTO: 88.5 FL — SIGNIFICANT CHANGE UP (ref 81–99)
MONOCYTES # BLD AUTO: 0.69 K/UL — HIGH (ref 0.1–0.6)
MONOCYTES NFR BLD AUTO: 6.9 % — SIGNIFICANT CHANGE UP (ref 1.7–9.3)
NEUTROPHILS # BLD AUTO: 5.58 K/UL — SIGNIFICANT CHANGE UP (ref 1.4–6.5)
NEUTROPHILS NFR BLD AUTO: 55.3 % — SIGNIFICANT CHANGE UP (ref 42.2–75.2)
NRBC # BLD: 0 /100 WBCS — SIGNIFICANT CHANGE UP (ref 0–0)
PLATELET # BLD AUTO: 368 K/UL — SIGNIFICANT CHANGE UP (ref 130–400)
POTASSIUM SERPL-MCNC: 4.2 MMOL/L — SIGNIFICANT CHANGE UP (ref 3.5–5)
POTASSIUM SERPL-SCNC: 4.2 MMOL/L — SIGNIFICANT CHANGE UP (ref 3.5–5)
RBC # BLD: 3.9 M/UL — LOW (ref 4.2–5.4)
RBC # FLD: 13.3 % — SIGNIFICANT CHANGE UP (ref 11.5–14.5)
SODIUM SERPL-SCNC: 138 MMOL/L — SIGNIFICANT CHANGE UP (ref 135–146)
WBC # BLD: 10.07 K/UL — SIGNIFICANT CHANGE UP (ref 4.8–10.8)
WBC # FLD AUTO: 10.07 K/UL — SIGNIFICANT CHANGE UP (ref 4.8–10.8)

## 2022-03-25 PROCEDURE — 73130 X-RAY EXAM OF HAND: CPT | Mod: 26,LT

## 2022-03-25 PROCEDURE — 99284 EMERGENCY DEPT VISIT MOD MDM: CPT

## 2022-03-25 RX ORDER — KETOROLAC TROMETHAMINE 30 MG/ML
15 SYRINGE (ML) INJECTION ONCE
Refills: 0 | Status: DISCONTINUED | OUTPATIENT
Start: 2022-03-25 | End: 2022-03-25

## 2022-03-25 RX ORDER — AZTREONAM 2 G
1 VIAL (EA) INJECTION
Qty: 20 | Refills: 0
Start: 2022-03-25 | End: 2022-04-03

## 2022-03-25 RX ADMIN — Medication 100 MILLIGRAM(S): at 23:04

## 2022-03-25 RX ADMIN — Medication 15 MILLIGRAM(S): at 21:21

## 2022-03-25 NOTE — ED PROVIDER NOTE - PATIENT PORTAL LINK FT
You can access the FollowMyHealth Patient Portal offered by [facility] by registering at the following website: [email or web address]. By joining [employer]’s FollowMyHealth portal, you will also be able to view your health information using other applications (apps) compatible with our system.

## 2022-03-25 NOTE — ED ADULT TRIAGE NOTE - RESPIRATORY RATE (BREATHS/MIN)
20 cellphone with , rings, earrings/Clothing/Wrist Watch/Jewelry/Money (specify)/Cell Phone/PDA (specify)

## 2022-03-25 NOTE — ED PROVIDER NOTE - PROGRESS NOTE DETAILS
hand surgery aware. patient evaluated by hand surgery, wash out and wound care applied. sent rx to pharmacy and given return precautions. patient to fu with ortho next week

## 2022-03-25 NOTE — ED PROVIDER NOTE - NS ED ATTENDING STATEMENT MOD
This was a shared visit with the MEGAN. I reviewed and verified the documentation and independently performed the documented:

## 2022-03-25 NOTE — ED PROVIDER NOTE - OBJECTIVE STATEMENT
46 year old female, no past medical history gerd, anxiety, who presents with finger pain/swelling. patient with recent lac repair to L 4th digit that occurred 2/2 knife, noticed gradual onset of L finger redness/discharge, outpatient PMD evaluated patient and removed 3/4 sutures. denies fever, chills, hand/wrist pain. 46 year old female, no past medical history gerd, anxiety, who presents with finger pain/swelling. patient with recent lac repair to L 4th digit that occurred 2/2 knife, patient presented to PMD x2 days ago for suture removal, 3/4 sutures removed, patient began noticing redness/swelling to finger prompting ed eval. denies fever, chills, hand/wrist pain.

## 2022-03-25 NOTE — ED PROVIDER NOTE - PHYSICAL EXAMINATION
CONSTITUTIONAL: Well-developed; well-nourished; in no acute distress, nontoxic appearing  SKIN: LUE: eyrthema/warmth to dorsum of L 4th digit with one suture intact, no streaking to hand.   EXT: Normal ROM. Pulses 2+    NEURO: awake, alert, following commands, oriented, grossly unremarkable. No Focal deficits. GCS 15.   PSYCH: Cooperative, appropriate.

## 2022-03-25 NOTE — CONSULT NOTE ADULT - SUBJECTIVE AND OBJECTIVE BOX
Pt Name: MARLON SANDOVAL  MRN: [id number]      HPI: 46yFemale presents with pain and wound of left dorsal ring finger at PIP. She sustained a laceration 2 weeks prior while cutting food with a knife. Was sutured in ED at that time. Did not take any abx. Up to date on tetanus. Was seen by PMD for suture removal but wound continued to be erythematous with some drainage so she returned to ED. Denies fevers/chills. Denies pain elsewhere. Denies paresthesias.      PAST MEDICAL & SURGICAL HISTORY:  GERD (gastroesophageal reflux disease)    Anxiety    History of D&C    Allergies: penicillins (Unknown)    Medications: trimethoprim  160 mG/sulfamethoxazole 800 mG 1 Tablet(s) Oral Once        PHYSICAL EXAM:    Vital Signs Last 24 Hrs  T(C): 36.8 (25 Mar 2022 18:05), Max: 36.8 (25 Mar 2022 18:05)  T(F): 98.2 (25 Mar 2022 18:05), Max: 98.2 (25 Mar 2022 18:05)  HR: 80 (25 Mar 2022 18:05) (80 - 80)  BP: 125/60 (25 Mar 2022 18:05) (125/60 - 125/60)  BP(mean): --  RR: 18 (25 Mar 2022 18:05) (18 - 18)  SpO2: 98% (25 Mar 2022 18:05) (98% - 98%)    Physical Exam:  General: NAD, Alert, Awake and oriented  Neurologic: No gross deficits, moving all 4s.  Head: NCAT without abrasions, lacerations, or ecchymosis to head, face, or scalp  Respiratory: Unlabored breathing   Abdomen: Soft, Nontender, no guarding.  Musculoskeletal:      LUE:  1 cm wound with flap of skin with underlying granulation tissue over dorsal PIP with one suture in wound  center with some fibrinous exudate expressed  surrounding erythema present   TTP over wound  NTTP rest of digit or hand  SILT in axillary, musculocutaneous,  radial, median, and ulnar distributions.   AIN/PIN/U motor intact  2+ radial pulse with brisk cap refill at distal finger tips.   Compartments soft and compressible.    Labs:                        11.0   10.07 )-----------( 368      ( 25 Mar 2022 21:39 )             34.5     03-25    138  |  102  |  14  ----------------------------<  88  4.2   |  26  |  0.6<L>    Ca    9.5      25 Mar 2022 21:39      Imaging: Left hand XR- No obvious fracture or dislocation. No osseous abnormalities. Minimal soft tissue swelling of left ring finger PIP    A/P:    46y Female with a left ring finger laceration sustained 2 weeks prior with superficial wound infection    - Procedure: Under sterile conditions, patient was given a digital block. Wound was opened and explored with minimal pus expressed. Wound was copiously irrigated with dilute betadine. Bulky finger dressing applied. Patient tolerated procedure well.  -Pain control  - PO Bactrim DS for 7-10 days  -Keep dressing C/D/I. Can remove in 2 days and begin TID warm soapy soaks with new guaze dressing applied after each soak  -Strict Extremity icing/elevation  -Patient instructed to return to ED if any worsening pain, numbness, tingling, fevers, chills or any other concerning symptoms  - F/U with Dr. Torres at [facility] in 1 week. Please call [phone]

## 2022-03-25 NOTE — ED PROVIDER NOTE - NS ED ROS FT
Review of Systems:  	•	CONSTITUTIONAL: no fever, no diaphoresis, no chills  	•	SKIN: +erythema/discharge to L 4th digit   	•	MUSCULOSKELETAL: +L 4th digit pain, no hand/wrist pain   	•	NEUROLOGIC: no weakness, no numbness   	•	PSYCH: no anxiety, non suicidal, non homicidal, no hallucination, no depression

## 2022-03-25 NOTE — ED PROVIDER NOTE - ATTENDING CONTRIBUTION TO CARE
46-year-old female with no past medical history who presents with left fourth digit pain.  Patient was seen in the ED on March 12  for a laceration to the left fourth digit which was sutured.  Patient saw her primary care doctor on Wednesday for stitch removal and since then noticed swelling and discharge from the finger.  Patient denies any fever chills or any medical complaints.    VITAL SIGNS: I have reviewed nursing notes and confirm.  CONSTITUTIONAL: non-toxic, well appearing  SKIN: no rash, no petechiae.  EYES: EOMI, pink conjunctiva, anicteric  ENT: tongue midline, no exudates, MMM  NECK: Supple; no meningismus, no JVD  EXT: Normal ROM x4.   LUE: eyrthema/warmth to dorsum of L 4th digit with one suture intact, full ROM at the pip, sensation intact. radial pulse +2.  NEURO: Alert, oriented x3.     a/p  46 yr old f that presents with L fourth digit pain. pt to be seen by ortho. labs, imaging, pain management. reassess. dispo pending.

## 2022-03-25 NOTE — ED PROVIDER NOTE - CARE PROVIDER_API CALL
Eladio Torres)  Orthopaedic Surgery  [address] [place] [address]  Phone: [phone]  Fax: [phone]  Follow Up Time: 1-3 Days

## 2022-03-25 NOTE — ED PROVIDER NOTE - NSFOLLOWUPINSTRUCTIONS_ED_ALL_ED_FT
CELLULITIS - Discharge Care     Cellulitis    WHAT YOU NEED TO KNOW:    Cellulitis is a skin infection caused by bacteria. Cellulitis may go away on its own or you may need treatment. Your healthcare provider may draw a Beaver around the outside edges of your cellulitis. If your cellulitis spreads, your healthcare provider will see it outside of the Beaver. Cellulitis          DISCHARGE INSTRUCTIONS:    Call 911 if:     You have sudden trouble breathing or chest pain.        Seek care immediately if:     Your wound gets larger and more painful.       You feel a crackling under your skin when you touch it.      You have purple dots or bumps on your skin, or you see bleeding under your skin.      You have new swelling and pain in your legs.      The red, warm, swollen area gets larger.      You see red streaks coming from the infected area.    Contact your healthcare provider if:     You have a fever.      Your fever or pain does not go away or gets worse.      The area does not get smaller after 2 days of antibiotics.      Your skin is flaking or peeling off.      You have questions or concerns about your condition or care.    Medicines:     Antibiotics help treat the bacterial infection.       NSAIDs, such as ibuprofen, help decrease swelling, pain, and fever. NSAIDs can cause stomach bleeding or kidney problems in certain people. If you take blood thinner medicine, always ask if NSAIDs are safe for you. Always read the medicine label and follow directions. Do not give these medicines to children under 6 months of age without direction from your child's healthcare provider.      Acetaminophen decreases pain and fever. It is available without a doctor's order. Ask how much to take and how often to take it. Follow directions. Read the labels of all other medicines you are using to see if they also contain acetaminophen, or ask your doctor or pharmacist. Acetaminophen can cause liver damage if not taken correctly. Do not use more than 4 grams (4,000 milligrams) total of acetaminophen in one day.       Take your medicine as directed. Contact your healthcare provider if you think your medicine is not helping or if you have side effects. Tell him or her if you are allergic to any medicine. Keep a list of the medicines, vitamins, and herbs you take. Include the amounts, and when and why you take them. Bring the list or the pill bottles to follow-up visits. Carry your medicine list with you in case of an emergency.    Self-care:     Elevate the area above the level of your heart as often as you can. This will help decrease swelling and pain. Prop the area on pillows or blankets to keep it elevated comfortably.       Clean the area daily until the wound scabs over. Gently wash the area with soap and water. Pat dry. Use dressings as directed.       Place cool or warm, wet cloths on the area as directed. Use clean cloths and clean water. Leave it on the area until the cloth is room temperature. Pat the area dry with a clean, dry cloth. The cloths may help decrease pain.     Prevent cellulitis:     Do not scratch bug bites or areas of injury. You increase your risk for cellulitis by scratching these areas.       Do not share personal items, such as towels, clothing, and razors.       Clean exercise equipment with germ-killing  before and after you use it.      Wash your hands often. Use soap and water. Wash your hands after you use the bathroom, change a child's diapers, or sneeze. Wash your hands before you prepare or eat food. Use lotion to prevent dry, cracked skin. Handwashing           Wear pressure stockings as directed. You may be told to wear the stockings if you have peripheral edema. The stockings improve blood flow and decrease swelling.      Treat athlete’s foot. This can help prevent the spread of a bacterial skin infection.    Follow up with your healthcare provider within 3 days, or as directed: Your healthcare provider will check if your cellulitis is getting better. You may need different medicine. Write down your questions so you remember to ask them during your visits.           Wound Care    Taking care of your wound properly can help to prevent pain and infection. It can also help your wound to heal more quickly.     HOW TO CARE FOR YOUR WOUND   Take or apply over-the-counter and prescription medicines only as told by your health care provider.  If you were prescribed antibiotic medicine, take or apply it as told by your health care provider. Do not stop using the antibiotic even if your condition improves.  Clean the wound each day or as told by your health care provider.  Wash the wound with mild soap and water.  Rinse the wound with water to remove all soap.  Pat the wound dry with a clean towel. Do not rub it.  There are many different ways to close and cover a wound. For example, a wound can be covered with stitches (sutures), skin glue, or adhesive strips. Follow instructions from your health care provider about:  How to take care of your wound.  When and how you should change your bandage (dressing).  When you should remove your dressing.  Removing whatever was used to close your wound.  Check your wound every day for signs of infection. Watch for:  Redness, swelling, or pain.  Fluid, blood, or pus.  Keep the dressing dry until your health care provider says it can be removed. Do not take baths, swim, use a hot tub, or do anything that would put your wound underwater until your health care provider approves.  Raise (elevate) the injured area above the level of your heart while you are sitting or lying down.  Do not scratch or pick at the wound.  Keep all follow-up visits as told by your health care provider. This is important.    SEEK MEDICAL CARE IF:  You received a tetanus shot and you have swelling, severe pain, redness, or bleeding at the injection site.  You have a fever.  Your pain is not controlled with medicine.  You have increased redness, swelling, or pain at the site of your wound.  You have fluid, blood, or pus coming from your wound.  You notice a bad smell coming from your wound or your dressing.    SEEK IMMEDIATE MEDICAL CARE IF:  You have a red streak going away from your wound.

## 2022-03-25 NOTE — ED PROVIDER NOTE - CLINICAL SUMMARY MEDICAL DECISION MAKING FREE TEXT BOX
46 yr old f that presents with L fourth digit pain. pt to be seen by ortho and cleared for dc. labs, imaging, pain management. pt discharged with ortho follow up and strict return precautions.

## 2022-03-26 LAB — CRP SERPL-MCNC: 4 MG/L — SIGNIFICANT CHANGE UP

## 2022-03-28 ENCOUNTER — EMERGENCY (EMERGENCY)
Facility: HOSPITAL | Age: 47
LOS: 0 days | Discharge: HOME | End: 2022-03-28
Payer: MEDICAID

## 2022-03-28 DIAGNOSIS — Z98.890 OTHER SPECIFIED POSTPROCEDURAL STATES: Chronic | ICD-10-CM

## 2022-03-28 DIAGNOSIS — Z02.9 ENCOUNTER FOR ADMINISTRATIVE EXAMINATIONS, UNSPECIFIED: ICD-10-CM

## 2022-03-28 PROCEDURE — L9981: CPT

## 2022-05-02 NOTE — OB RN PATIENT PROFILE - URINARY CATHETER
Care Transitions Program Week 3 Follow-up Call    Current Issues/Problems reviewed on call: Spoke with patient who reports that her asthma symptoms and throat clearing have improved. She reports she has been having a headache at times over the last few days, and also believes she hurt her back yesterday after lifting some heavy bags. She has been taking Tylenol prn which has been providing relief. She has not updated her PCP about headache or back pain. No other questions at this time.     Details of Interventions/Education completed on call: systems review, recent appointment review, discussed importance of updating PCP with new or worsening symptoms    Review of Systems:   Care Transition System Evaluation: Patient reports her asthma symptoms and throat clearing has improved.  General Symptoms: Headache  Fever: is not present  Pain: 7  Pain Location: back pain--taking Tylenol prn which patient reports provides relief (patient reports she was lifting heavy items yesterday)  Respiratory Symptoms: Cough; Throat clearing  Cardiovascular Symptoms: None  Sleeping Behaviors: Reports no problem  GI Symptoms: None   Symptoms: None  Urinary Elimination: Voiding, no difficulities  Skin Symptoms: None  Wound Type: None    The patient is taking all medications as prescribed. The patient did not have questions or concerns regarding the medications prescribed.    Transitional Care Management (TCM) appointment was completed.  TCM appointment plan of care and upcoming appointments were reviewed with patient.      Next Care Transitions follow-up call will be within 1 week.    Plan for next follow-up call: weekly f/u call, case closure planning, systems review  
no

## 2022-05-08 ENCOUNTER — INPATIENT (INPATIENT)
Facility: HOSPITAL | Age: 47
LOS: 0 days | Discharge: HOME | End: 2022-05-09
Attending: STUDENT IN AN ORGANIZED HEALTH CARE EDUCATION/TRAINING PROGRAM | Admitting: STUDENT IN AN ORGANIZED HEALTH CARE EDUCATION/TRAINING PROGRAM
Payer: MEDICAID

## 2022-05-08 ENCOUNTER — TRANSCRIPTION ENCOUNTER (OUTPATIENT)
Age: 47
End: 2022-05-08

## 2022-05-08 ENCOUNTER — RESULT REVIEW (OUTPATIENT)
Age: 47
End: 2022-05-08

## 2022-05-08 VITALS
SYSTOLIC BLOOD PRESSURE: 121 MMHG | DIASTOLIC BLOOD PRESSURE: 77 MMHG | RESPIRATION RATE: 18 BRPM | HEIGHT: 60 IN | HEART RATE: 101 BPM | TEMPERATURE: 98 F | OXYGEN SATURATION: 100 %

## 2022-05-08 DIAGNOSIS — Z98.890 OTHER SPECIFIED POSTPROCEDURAL STATES: Chronic | ICD-10-CM

## 2022-05-08 LAB
ANION GAP SERPL CALC-SCNC: 14 MMOL/L — SIGNIFICANT CHANGE UP (ref 7–14)
APTT BLD: 29.4 SEC — SIGNIFICANT CHANGE UP (ref 27–39.2)
BASOPHILS # BLD AUTO: 0.04 K/UL — SIGNIFICANT CHANGE UP (ref 0–0.2)
BASOPHILS NFR BLD AUTO: 0.4 % — SIGNIFICANT CHANGE UP (ref 0–1)
BLD GP AB SCN SERPL QL: SIGNIFICANT CHANGE UP
BUN SERPL-MCNC: 11 MG/DL — SIGNIFICANT CHANGE UP (ref 10–20)
CALCIUM SERPL-MCNC: 9.5 MG/DL — SIGNIFICANT CHANGE UP (ref 8.5–10.1)
CHLORIDE SERPL-SCNC: 98 MMOL/L — SIGNIFICANT CHANGE UP (ref 98–110)
CO2 SERPL-SCNC: 23 MMOL/L — SIGNIFICANT CHANGE UP (ref 17–32)
CREAT SERPL-MCNC: 0.6 MG/DL — LOW (ref 0.7–1.5)
EGFR: 113 ML/MIN/1.73M2 — SIGNIFICANT CHANGE UP
EOSINOPHIL # BLD AUTO: 0.11 K/UL — SIGNIFICANT CHANGE UP (ref 0–0.7)
EOSINOPHIL NFR BLD AUTO: 1.1 % — SIGNIFICANT CHANGE UP (ref 0–8)
GLUCOSE SERPL-MCNC: 102 MG/DL — HIGH (ref 70–99)
HCT VFR BLD CALC: 36.6 % — LOW (ref 37–47)
HGB BLD-MCNC: 12.3 G/DL — SIGNIFICANT CHANGE UP (ref 12–16)
IMM GRANULOCYTES NFR BLD AUTO: 0.3 % — SIGNIFICANT CHANGE UP (ref 0.1–0.3)
INR BLD: 0.99 RATIO — SIGNIFICANT CHANGE UP (ref 0.65–1.3)
LYMPHOCYTES # BLD AUTO: 1.5 K/UL — SIGNIFICANT CHANGE UP (ref 1.2–3.4)
LYMPHOCYTES # BLD AUTO: 14.5 % — LOW (ref 20.5–51.1)
MCHC RBC-ENTMCNC: 28.8 PG — SIGNIFICANT CHANGE UP (ref 27–31)
MCHC RBC-ENTMCNC: 33.6 G/DL — SIGNIFICANT CHANGE UP (ref 32–37)
MCV RBC AUTO: 85.7 FL — SIGNIFICANT CHANGE UP (ref 81–99)
MONOCYTES # BLD AUTO: 0.64 K/UL — HIGH (ref 0.1–0.6)
MONOCYTES NFR BLD AUTO: 6.2 % — SIGNIFICANT CHANGE UP (ref 1.7–9.3)
NEUTROPHILS # BLD AUTO: 7.99 K/UL — HIGH (ref 1.4–6.5)
NEUTROPHILS NFR BLD AUTO: 77.5 % — HIGH (ref 42.2–75.2)
NRBC # BLD: 0 /100 WBCS — SIGNIFICANT CHANGE UP (ref 0–0)
PLATELET # BLD AUTO: 361 K/UL — SIGNIFICANT CHANGE UP (ref 130–400)
POTASSIUM SERPL-MCNC: 3.9 MMOL/L — SIGNIFICANT CHANGE UP (ref 3.5–5)
POTASSIUM SERPL-SCNC: 3.9 MMOL/L — SIGNIFICANT CHANGE UP (ref 3.5–5)
PROTHROM AB SERPL-ACNC: 11.4 SEC — SIGNIFICANT CHANGE UP (ref 9.95–12.87)
RBC # BLD: 4.27 M/UL — SIGNIFICANT CHANGE UP (ref 4.2–5.4)
RBC # FLD: 13.3 % — SIGNIFICANT CHANGE UP (ref 11.5–14.5)
SODIUM SERPL-SCNC: 135 MMOL/L — SIGNIFICANT CHANGE UP (ref 135–146)
WBC # BLD: 10.31 K/UL — SIGNIFICANT CHANGE UP (ref 4.8–10.8)
WBC # FLD AUTO: 10.31 K/UL — SIGNIFICANT CHANGE UP (ref 4.8–10.8)

## 2022-05-08 PROCEDURE — 99285 EMERGENCY DEPT VISIT HI MDM: CPT

## 2022-05-08 RX ORDER — GLUCAGON INJECTION, SOLUTION 0.5 MG/.1ML
1 INJECTION, SOLUTION SUBCUTANEOUS ONCE
Refills: 0 | Status: COMPLETED | OUTPATIENT
Start: 2022-05-08 | End: 2022-05-08

## 2022-05-08 RX ORDER — FAMOTIDINE 10 MG/ML
20 INJECTION INTRAVENOUS ONCE
Refills: 0 | Status: COMPLETED | OUTPATIENT
Start: 2022-05-08 | End: 2022-05-08

## 2022-05-08 RX ADMIN — GLUCAGON INJECTION, SOLUTION 1 MILLIGRAM(S): 0.5 INJECTION, SOLUTION SUBCUTANEOUS at 21:11

## 2022-05-08 RX ADMIN — FAMOTIDINE 20 MILLIGRAM(S): 10 INJECTION INTRAVENOUS at 22:33

## 2022-05-08 RX ADMIN — FAMOTIDINE 20 MILLIGRAM(S): 10 INJECTION INTRAVENOUS at 21:11

## 2022-05-08 RX ADMIN — GLUCAGON INJECTION, SOLUTION 1 MILLIGRAM(S): 0.5 INJECTION, SOLUTION SUBCUTANEOUS at 22:27

## 2022-05-08 NOTE — ED ADULT NURSE NOTE - OBJECTIVE STATEMENT
pt was biba, along with the pt child in a stroller. stated a piece of chicken was stuck in her throat.  Pt is alert and oriented 3. pt airway patent with equal respirations, no distress noted, no accessory muscle use noted, no stridor noted. arom x 4 extrem. Pt is on her cellular device. pt also walks around with steady gait. refusing to answer most questions.  No obvious injuries noted.

## 2022-05-08 NOTE — ED ADULT NURSE NOTE - CHIEF COMPLAINT QUOTE
Pt. BIBA with complains of having piece chicken stuck in throat. As per EMS episode began approx 1 hour ago. Pt. unwilling to comply with additional questions at this time. No signs of respiratory distress noted. Voice noted clear. No stridor noted. Pt. noted recording on mobile device.

## 2022-05-08 NOTE — ED ADULT TRIAGE NOTE - CHIEF COMPLAINT QUOTE
Pt. BIBA with complains of having piece chicken stuck in throat. As per EMS episode began approx 1 hour ago. Pt. un willing to comply with additional questions at this time. No signs of respiratory distress noted. Voice noted clear. No stidor noted. Pt. noted recording on mobile device. Pt. BIBA with complains of having piece chicken stuck in throat. As per EMS episode began approx 1 hour ago. Pt. unwilling to comply with additional questions at this time. No signs of respiratory distress noted. Voice noted clear. No stridor noted. Pt. noted recording on mobile device.

## 2022-05-09 ENCOUNTER — TRANSCRIPTION ENCOUNTER (OUTPATIENT)
Age: 47
End: 2022-05-09

## 2022-05-09 VITALS
SYSTOLIC BLOOD PRESSURE: 114 MMHG | DIASTOLIC BLOOD PRESSURE: 72 MMHG | RESPIRATION RATE: 17 BRPM | OXYGEN SATURATION: 99 % | HEART RATE: 71 BPM

## 2022-05-09 LAB — SARS-COV-2 RNA SPEC QL NAA+PROBE: DETECTED

## 2022-05-09 PROCEDURE — 43239 EGD BIOPSY SINGLE/MULTIPLE: CPT | Mod: XU

## 2022-05-09 PROCEDURE — 99223 1ST HOSP IP/OBS HIGH 75: CPT | Mod: 25

## 2022-05-09 PROCEDURE — 88305 TISSUE EXAM BY PATHOLOGIST: CPT | Mod: 26

## 2022-05-09 PROCEDURE — 43247 EGD REMOVE FOREIGN BODY: CPT

## 2022-05-09 RX ORDER — PANTOPRAZOLE SODIUM 20 MG/1
40 TABLET, DELAYED RELEASE ORAL
Refills: 0 | Status: DISCONTINUED | OUTPATIENT
Start: 2022-05-09 | End: 2022-05-09

## 2022-05-09 RX ORDER — ONDANSETRON 8 MG/1
4 TABLET, FILM COATED ORAL
Refills: 0 | Status: DISCONTINUED | OUTPATIENT
Start: 2022-05-09 | End: 2022-05-09

## 2022-05-09 RX ORDER — SIMETHICONE 80 MG/1
80 TABLET, CHEWABLE ORAL EVERY 4 HOURS
Refills: 0 | Status: DISCONTINUED | OUTPATIENT
Start: 2022-05-09 | End: 2022-05-09

## 2022-05-09 RX ORDER — PANTOPRAZOLE SODIUM 20 MG/1
1 TABLET, DELAYED RELEASE ORAL
Qty: 60 | Refills: 0
Start: 2022-05-09 | End: 2022-06-07

## 2022-05-09 RX ORDER — MORPHINE SULFATE 50 MG/1
2 CAPSULE, EXTENDED RELEASE ORAL
Refills: 0 | Status: DISCONTINUED | OUTPATIENT
Start: 2022-05-09 | End: 2022-05-09

## 2022-05-09 RX ORDER — ACETAMINOPHEN 500 MG
1000 TABLET ORAL ONCE
Refills: 0 | Status: COMPLETED | OUTPATIENT
Start: 2022-05-09 | End: 2022-05-09

## 2022-05-09 RX ORDER — SODIUM CHLORIDE 9 MG/ML
1000 INJECTION, SOLUTION INTRAVENOUS
Refills: 0 | Status: DISCONTINUED | OUTPATIENT
Start: 2022-05-09 | End: 2022-05-09

## 2022-05-09 RX ORDER — HYDROMORPHONE HYDROCHLORIDE 2 MG/ML
0.5 INJECTION INTRAMUSCULAR; INTRAVENOUS; SUBCUTANEOUS
Refills: 0 | Status: DISCONTINUED | OUTPATIENT
Start: 2022-05-09 | End: 2022-05-09

## 2022-05-09 RX ORDER — OXYCODONE AND ACETAMINOPHEN 5; 325 MG/1; MG/1
1 TABLET ORAL ONCE
Refills: 0 | Status: DISCONTINUED | OUTPATIENT
Start: 2022-05-09 | End: 2022-05-09

## 2022-05-09 RX ADMIN — SODIUM CHLORIDE 75 MILLILITER(S): 9 INJECTION, SOLUTION INTRAVENOUS at 04:00

## 2022-05-09 RX ADMIN — Medication 1000 MILLIGRAM(S): at 05:23

## 2022-05-09 RX ADMIN — Medication 400 MILLIGRAM(S): at 04:53

## 2022-05-09 NOTE — CHART NOTE - NSCHARTNOTEFT_GEN_A_CORE
PACU ANESTHESIA ADMISSION NOTE      Procedure:   Post op diagnosis:      ____  Intubated  TV:______       Rate: ______      FiO2: ______    __x__  Patent Airway    __x__  Full return of protective reflexes    __x__  Full recovery from anesthesia / back to baseline     Vitals:    See Anesthesia Record      Mental Status:  _x___ Awake   __x___ Alert   _____ Drowsy   _____ Sedated    Nausea/Vomiting:  _x___ NO  ______Yes,   __x__ See Post - Op Orders          Pain Scale (0-10):  __0___    Treatment: ____ None    __x__ See Post - Op/PCA Orders    Post - Operative Fluids:   ____ Oral   __x__ See Post - Op Orders    Plan: Discharge:   _x___Home       _____Floor     _____Critical Care    _____  Other:_________________    Comments: Uneventful anesthesia. Patient transported to PACU awake and responsive, spontaneously breathing and hemodynamically stable. Report given to PACU RN at bedside

## 2022-05-09 NOTE — DISCHARGE NOTE NURSING/CASE MANAGEMENT/SOCIAL WORK - PATIENT PORTAL LINK FT
You can access the FollowMyHealth Patient Portal offered by [facility] by registering at the following website: [email or web address]. By joining [employer]’s FollowMyHealth portal, you will also be able to view your health information using other applications (apps) compatible with our system.

## 2022-05-09 NOTE — DISCHARGE NOTE NURSING/CASE MANAGEMENT/SOCIAL WORK - NSDPDISTO_GEN_ALL_CORE
Home Patient is 62-year-old male who came to the emergency department for complaints of fever.  He is being placed in observation cultures are pending.Possible early infiltrate right lower lobe. on chest x-ray however patient has no sputum cough and normal WBC.  Will check  PA and lateral chest x-ray later this morning.

## 2022-05-09 NOTE — H&P ADULT - ASSESSMENT
patient is a 46 year old female with a history of GERD, hiatal hernia, and esophageal dysmotility s/p esophageal dilation few years ago presents to the ED with food impaction.    # Food impaction  -NPO IVF  -Urgent EGD  -GI f/u    #GERD  -protonix 40mg PRN    #Misc  - DVT Prophylaxis: SCDs for now  - GI Prophylaxis: protonix  - Diet: NPO  - Activity: as tolerated  - Code Status: Full Code

## 2022-05-09 NOTE — DISCHARGE NOTE PROVIDER - PROVIDER TOKENS
PROVIDER:[TOKEN:[[id number]],FOLLOWUP:[1 week]] PROVIDER:[TOKEN:[[id number]],FOLLOWUP:[1 week]]

## 2022-05-09 NOTE — DISCHARGE NOTE NURSING/CASE MANAGEMENT/SOCIAL WORK - NSDCPEFALRISK_GEN_ALL_CORE
For information on Fall & Injury Prevention, visit: [email or web address] OR  [email or web address] OR  https://www.cdc.gov/steadi/patient.html

## 2022-05-09 NOTE — CHART NOTE - NSCHARTNOTEFT_GEN_A_CORE
Patient had urgent EGD.    Impression:   - No food noted in pharynx or esophagus   - Corrugated mucosa and linear furrows in the whole esophagus compatible with esophagitis. (Biopsy).     - Erythema in the stomach compatible with non-erosive gastritis.      - Normal mucosa in the whole examined duodenum.      Rec:  - Protonix 40 mg BID  -Await Pathology results  -Puree diet  -Follow up as outpatient with GI clinic in 2 weeks  - patient can be discharged from GI stand point

## 2022-05-09 NOTE — CONSULT NOTE ADULT - SUBJECTIVE AND OBJECTIVE BOX
Gastroenterology Consultation:    Patient is a 46y old  Female who presents with a chief complaint of     Admitted on: 05-08-22  HPI:  46 year old female with a history of GERD, hiatal hernia, and esophageal dysmotility s/p esophageal dilation few years ago presents to the ED with food impaction. Patient states that a piece of chicken breast lodged in her esophagus 1 hour prior to arrival. She is now experiencing sensation of foreign body in her throat with burning in her esophagus. She is now unable to tolerate her secretions and having to spit them out. Denies difficulty breathing, cough, chest pain, nausea, vomiting. Denies history of food impaction.        Prior EGD: As per patient esophageal dilation few years ago        PAST MEDICAL & SURGICAL HISTORY:  GERD (gastroesophageal reflux disease)    Anxiety    History of D&amp;C        FAMILY HISTORY:  No pertinent family history in first degree relatives        Social History:  Tobacco: Vaping  Alcohol: N  Drugs: N    Home Medications:    MEDICATIONS  (STANDING):    MEDICATIONS  (PRN):      Allergies  penicillins (Unknown)      Review of Systems:   Constitutional:  No Fever, No Chills  ENT/Mouth:  No Hearing Changes,  No Difficulty Swallowing  Eyes:  No Eye Pain, No Vision Changes  Cardiovascular:  No Chest Pain, No Palpitations  Respiratory:  No Cough, No Dyspnea  Gastrointestinal:  As described in HPI  Musculoskeletal:  No Joint Swelling, No Back Pain  Skin:  No Skin Lesions, No Jaundice  Neuro:  No Syncope, No Dizziness  Heme/Lymph:  No Bruising, No Bleeding.          Physical Examination:  T(C): 36.2 (05-08-22 @ 23:33), Max: 36.6 (05-08-22 @ 19:34)  HR: 98 (05-08-22 @ 23:33) (98 - 101)  BP: 131/73 (05-08-22 @ 23:33) (121/77 - 131/73)  RR: 18 (05-08-22 @ 23:33) (18 - 18)  SpO2: 99% (05-08-22 @ 23:33) (99% - 100%)  Height (cm): 152.4 (05-09-22 @ 01:50)  Weight (kg): 65.8 (05-09-22 @ 01:50)      Constitutional: No acute distress.  Eyes:. Conjunctivae are clear, Sclera is non-icteric.  Ears Nose and Throat: The external ears are normal appearing,  Oral mucosa is pink and moist.  Respiratory:  No signs of respiratory distress. Lung sounds are clear bilaterally.  Cardiovascular:  S1 S2, Regular rate and rhythm.  GI: Abdomen is soft, symmetric, and non-tender without distention. There are no visible lesions. Bowel sounds are present and normoactive in all four quadrants. No masses, hepatomegaly, or splenomegaly are noted.   Neuro: No Tremor, No involuntary movements  Skin: No rashes, No Jaundice.          Data: (reviewed by attending)                        12.3   10.31 )-----------( 361      ( 08 May 2022 21:37 )             36.6     Hgb Trend:  12.3  05-08-22 @ 21:37      05-08    135  |  98  |  11  ----------------------------<  102<H>  3.9   |  23  |  0.6<L>    Ca    9.5      08 May 2022 21:37      Liver panel trend:      PT/INR - ( 08 May 2022 21:37 )   PT: 11.40 sec;   INR: 0.99 ratio         PTT - ( 08 May 2022 21:37 )  PTT:29.4 sec        Radiology:(reviewed by attending)

## 2022-05-09 NOTE — DISCHARGE NOTE PROVIDER - HOSPITAL COURSE
patient is a 46 year old female with a history of GERD, hiatal hernia, and esophageal dysmotility s/p esophageal dilation few years ago presents to the ED with food impaction. Patient states that a piece of chicken breast lodged in her esophagus 1 hour prior to arrival. She is now experiencing sensation of foreign body in her throat with burning in her esophagus. She is now unable to tolerate her secretions and having to spit them out. Denies difficulty breathing, cough, chest pain, nausea, vomiting. Denies history of food impaction.    Patient had urgent EGD.    Impression:   - No food noted in pharynx or esophagus   - Corrugated mucosa and linear furrows in the whole esophagus compatible with esophagitis. (Biopsy).     - Erythema in the stomach compatible with non-erosive gastritis.      - Normal mucosa in the whole examined duodenum.      Rec:  - Protonix 40 mg BID  -Await Pathology results  -Puree diet  -Follow up as outpatient with GI clinic in 2 weeks  - patient can be discharged from GI stand point.

## 2022-05-09 NOTE — H&P ADULT - NSHPLABSRESULTS_GEN_ALL_CORE
Labs:  CAPILLARY BLOOD GLUCOSE                              12.3   10.31 )-----------( 361      ( 08 May 2022 21:37 )             36.6       Auto Neutrophil %: 77.5 % (05-08-22 @ 21:37)  Auto Immature Granulocyte %: 0.3 % (05-08-22 @ 21:37)    05-08    135  |  98  |  11  ----------------------------<  102<H>  3.9   |  23  |  0.6<L>      Calcium, Total Serum: 9.5 mg/dL (05-08-22 @ 21:37)      LFTs:         Coags:     11.40  ----< 0.99    ( 08 May 2022 21:37 )     29.4

## 2022-05-09 NOTE — DISCHARGE NOTE PROVIDER - CARE PROVIDER_API CALL
Rufino Castrejon)  Gastroenterology; Internal Medicine  [address] [place] [address]  Phone: [phone]  Fax: [phone]  Follow Up Time: 1 week   Justina Baker)  Gastroenterology  [address] [place] [address]  Phone: [phone]  Fax: [phone]  Follow Up Time: 1 week

## 2022-05-09 NOTE — H&P ADULT - HISTORY OF PRESENT ILLNESS
patient is a 46 year old female with a history of GERD, hiatal hernia, and esophageal dysmotility s/p esophageal dilation few years ago presents to the ED with food impaction. Patient states that a piece of chicken breast lodged in her esophagus 1 hour prior to arrival. She is now experiencing sensation of foreign body in her throat with burning in her esophagus. She is now unable to tolerate her secretions and having to spit them out. Denies difficulty breathing, cough, chest pain, nausea, vomiting. Denies history of food impaction.

## 2022-05-09 NOTE — ED PROVIDER NOTE - CLINICAL SUMMARY MEDICAL DECISION MAKING FREE TEXT BOX
Patient intolerant of PO.  No dyspnea or drooling.  GI consulted.  Will admit for urgent endoscopy, r/o food bolus impaction.

## 2022-05-09 NOTE — DISCHARGE NOTE NURSING/CASE MANAGEMENT/SOCIAL WORK - NSDCPNINST_GEN_ALL_CORE
go to ED if unable to swallow, unable to breathe, throat is swollen or any other issue. continue practice safe hand hygiene and covid precautions

## 2022-05-09 NOTE — H&P ADULT - NSHPPHYSICALEXAM_GEN_ALL_CORE
GENERAL: NAD,   HEAD:  Atraumatic, Normocephalic  EYES: EOMI, PERRLA, conjunctiva and sclera clear  ENT: Moist mucous membranes  NECK: Supple, No JVD  CHEST/LUNG: Clear to auscultation bilaterally; No rales, rhonchi, wheezing, or rubs.   HEART: Regular rate and rhythm; No murmurs, rubs, or gallops  ABDOMEN: Bowel sounds present; Soft, mildly tender, Nondistended.   EXTREMITIES:  2+ Peripheral Pulses, brisk capillary refill. No clubbing, cyanosis, or edema  NERVOUS SYSTEM:  Alert & Oriented X3, speech clear. No deficits   MSK: FROM all 4 extremities, full and equal strength  SKIN: No rashes or lesions

## 2022-05-09 NOTE — DISCHARGE NOTE PROVIDER - NSDCCPCAREPLAN_GEN_ALL_CORE_FT
PRINCIPAL DISCHARGE DIAGNOSIS  Diagnosis: Food impaction of esophagus  Assessment and Plan of Treatment: Impression:   - No food noted in pharynx or esophagus   - Corrugated mucosa and linear furrows in the whole esophagus compatible with esophagitis. (Biopsy).     - Erythema in the stomach compatible with non-erosive gastritis.      - Normal mucosa in the whole examined duodenum.    Rec:  - Protonix 40 mg BID  -Await Pathology results  -Puree diet  -Follow up as outpatient with GI clinic in 2 weeks  - patient can be discharged from GI stand point.         PRINCIPAL DISCHARGE DIAGNOSIS  Diagnosis: Food impaction of esophagus  Assessment and Plan of Treatment: Impression:   - No food noted in pharynx or esophagus   - Corrugated mucosa and linear furrows in the whole esophagus compatible with esophagitis. (Biopsy).     - Erythema in the stomach compatible with non-erosive gastritis.      - Normal mucosa in the whole examined duodenum.    Rec:  - Protonix 40 mg BID  -Await Pathology results  -Puree diet  -Follow up as outpatient with GI clinic in 2 weeks  PLEASE FOLLOW UP IN THE GI CLINIC AT [address] [place] [address]. PLEASE CALL [phone] to schedule your appointment

## 2022-05-09 NOTE — DISCHARGE NOTE PROVIDER - NSDCMRMEDTOKEN_GEN_ALL_CORE_FT
acetaminophen 160 mg/5 mL oral suspension: 30.47 milliliter(s) orally every 6 hours, As Needed   doxycycline hyclate 100 mg oral capsule: 1 cap(s) orally 2 times a day   ibuprofen 100 mg/5 mL oral suspension: 30 milliliter(s) orally every 6 hours, As Needed   pantoprazole 40 mg oral delayed release tablet: 1 tab(s) orally 2 times a day   simethicone 80 mg oral tablet, chewable: 1 tab(s) orally every 4 hours, As Needed

## 2022-05-09 NOTE — DISCHARGE NOTE PROVIDER - CARE PROVIDERS DIRECT ADDRESSES
[email or web address]

## 2022-05-09 NOTE — CONSULT NOTE ADULT - ASSESSMENT
46 year old female with a history of GERD, hiatal hernia, and esophageal dysmotility s/p esophageal dilation few years ago presents to the ED with food impaction. Patient states that a piece of chicken breast lodged in her esophagus 1 hour prior to arrival. She is now experiencing sensation of foreign body in her throat with burning in her esophagus. She is now unable to tolerate her secretions and having to spit them out. Denies difficulty breathing, cough, chest pain, nausea, vomiting. Denies history of food impaction.      # Food impaction:  - vital sign stable  - patient unable to tolerate her secretions and having to spit them out    Rec:  - NPO  - will do urgent EGD  - OR team noified 46 year old female with a history of GERD, hiatal hernia, and esophageal dysmotility s/p esophageal dilation few years ago presents to the ED with food impaction. Patient states that a piece of chicken breast lodged in her esophagus 1 hour prior to arrival. She is now experiencing sensation of foreign body in her throat with burning in her esophagus. She is now unable to tolerate her secretions and having to spit them out. Denies difficulty breathing, cough, chest pain, nausea, vomiting. Denies history of food impaction.      # Food impaction:  - vital sign stable  - patient unable to tolerate her secretions and having to spit them out    Rec:  - NPO  - will do urgent EGD  - OR team notified  risks and benefits d/w pt

## 2022-05-09 NOTE — ED PROVIDER NOTE - NS ED ROS FT
Constitutional: (-) fever  Eyes/ENT: (-) blurry vision, (-) epistaxis   Cardiovascular: (-) chest pain, (-) syncope  Respiratory: (-) cough, (-) shortness of breath (-) stridor (-) wheezing  Gastrointestinal: (+) chicken food impaction (+) dysphagia (+) burning of esophagus (-) nausea (-) vomiting, (-) diarrhea  Musculoskeletal: (-) neck pain, (-) back pain, (-) joint pain  Integumentary: (-) rash, (-) edema  Neurological: (-) headache, (-) altered mental status  Psychiatric: (+) anxious (-) hallucinations  Allergic/Immunologic: (-) pruritus

## 2022-05-09 NOTE — ED PROVIDER NOTE - OBJECTIVE STATEMENT
46 year old female with a history of GERD, hiatal hernia, and esophageal dysmotility s/p esophageal dilation presents to the ED with food impaction. Patient states that a piece of chicken breast lodged in her esophagus 1 hour prior to arrival. She is now experiencing sensation of foreign body in her throat with burning in her esophagus. She is now unable to tolerate her secretions and having to spit them out. Denies difficulty breathing, cough, chest pain, nausea, vomiting. Denies history of food impaction.

## 2022-05-14 DIAGNOSIS — K44.9 DIAPHRAGMATIC HERNIA WITHOUT OBSTRUCTION OR GANGRENE: ICD-10-CM

## 2022-05-14 DIAGNOSIS — K20.0 EOSINOPHILIC ESOPHAGITIS: ICD-10-CM

## 2022-05-14 DIAGNOSIS — Z88.0 ALLERGY STATUS TO PENICILLIN: ICD-10-CM

## 2022-05-14 DIAGNOSIS — K29.70 GASTRITIS, UNSPECIFIED, WITHOUT BLEEDING: ICD-10-CM

## 2022-06-10 ENCOUNTER — NON-APPOINTMENT (OUTPATIENT)
Age: 47
End: 2022-06-10

## 2022-07-18 NOTE — ED ADULT NURSE NOTE - HIV OFFER
23 y.o. female  at 14w4d   Reports no fetal movement or fluttering. Denies any vaginal bleeding, leakage of fluid, cramping, contractions, or pressure.   States has papers to complete for work and showed them to me on the phone- pt informed to print, email them to be filled out in order to return them to her- verbalized understanding  Total weight gain/weight loss in pregnancy: -0.907 kg (-2 lb)     A: 14w4d     ICD-10-CM ICD-9-CM    1. 14 weeks gestation of pregnancy  Z3A.14 V22.2 POCT URINALYSIS   2. BMI 32.0-32.9,adult  Z68.32 V85.32 Glucose, 1Hr Post Prandial      Glucose, 1Hr Post Prandial       P: Bleeding, daily fetal kick counts, and  labor/labor precautions discussed.  Early 1 hr gtt today  Questions answered to desired level of satisfaction  Verbalized understanding to all information and instructions provided.    Follow up in about 4 weeks (around 8/15/2022), or if symptoms worsen or fail to improve, for TAD visit.    Susana Millard, DNP, CNM, WHNP-BC   Previously Declined (within the last year)

## 2022-07-22 ENCOUNTER — OUTPATIENT (OUTPATIENT)
Dept: OUTPATIENT SERVICES | Facility: HOSPITAL | Age: 47
LOS: 1 days | Discharge: HOME | End: 2022-07-22

## 2022-07-22 ENCOUNTER — APPOINTMENT (OUTPATIENT)
Age: 47
End: 2022-07-22

## 2022-07-22 ENCOUNTER — NON-APPOINTMENT (OUTPATIENT)
Age: 47
End: 2022-07-22

## 2022-07-22 VITALS
DIASTOLIC BLOOD PRESSURE: 69 MMHG | WEIGHT: 143 LBS | BODY MASS INDEX: 28.07 KG/M2 | HEIGHT: 60 IN | SYSTOLIC BLOOD PRESSURE: 113 MMHG | HEART RATE: 80 BPM | OXYGEN SATURATION: 99 % | TEMPERATURE: 98.1 F

## 2022-07-22 DIAGNOSIS — Z98.890 OTHER SPECIFIED POSTPROCEDURAL STATES: Chronic | ICD-10-CM

## 2022-07-22 DIAGNOSIS — R13.10 DYSPHAGIA, UNSPECIFIED: ICD-10-CM

## 2022-07-22 DIAGNOSIS — R14.0 ABDOMINAL DISTENSION (GASEOUS): ICD-10-CM

## 2022-07-22 DIAGNOSIS — K21.9 GASTRO-ESOPHAGEAL REFLUX DISEASE W/OUT ESOPHAGITIS: ICD-10-CM

## 2022-07-22 NOTE — HISTORY OF PRESENT ILLNESS
[Nausea] : denies nausea [Vomiting] : denies vomiting [Diarrhea] : denies diarrhea [Constipation] : denies constipation [Yellow Skin Or Eyes (Jaundice)] : denies jaundice [Abdominal Pain] : denies abdominal pain [Abdominal Swelling] : denies abdominal swelling [Rectal Pain] : denies rectal pain [Heartburn] : stable heartburn [de-identified] : 45 y/o f was in hospital on 5/9/2022 for food impaction and had urgent EGD and esophagus macroscopically was suggestive of EoE but pathology was  not compatible with EOE.\par She reports that she had similar symptoms few years ago and had EGD at [facility] and she was told that they opened up her upper esophagus. \par She states that if she eat solid food very fast and not chewing very well food may takes time to pass. She also endorses reflux symptoms after eating tomato sauce, coffee and resolves after taking Pepcid.\par \par FH: Non-contributory\par SH: Smoking ( 2 cig per day), no EtOH, No drugs\par

## 2022-07-22 NOTE — ASSESSMENT
[FreeTextEntry1] : 45 y/o f was in hospital on 5/9/2022 for food impaction and had urgent EGD and esophagus macroscopically was suggestive of EoE but pathology was  not compatible with EOE.\par She reports that she had similar symptoms few years ago and had EGD at [facility] and she was told that they opened up her upper esophagus. \par She states that if she eat solid food very fast and not chewing very well food may takes time to pass. She also endorses reflux symptoms after eating tomato sauce, coffee and resolves after taking Pepcid.\par \par FH: Non-contributory\par SH: Smoking ( 2 cig per day), no EtOH, No drugs\par \par #Dysphagia\par - EGD on 5/9/22: esophagus macroscopically was suggestive of EoE but pathology was  not compatible with EOE.\par \par Rec:\par - Barium esophagogram\par - Will plan for manometry\par - diet as tolerated, puree food and small pieces of solid food after chewing properly\par \par \par # GERD:\par - Life style modification educated\par - GERD diet education\par - Pepcid PRN

## 2022-07-24 ENCOUNTER — NON-APPOINTMENT (OUTPATIENT)
Age: 47
End: 2022-07-24

## 2022-10-31 ENCOUNTER — NON-APPOINTMENT (OUTPATIENT)
Age: 47
End: 2022-10-31

## 2022-11-09 ENCOUNTER — APPOINTMENT (OUTPATIENT)
Dept: PEDIATRIC ALLERGY IMMUNOLOGY | Facility: CLINIC | Age: 47
End: 2022-11-09

## 2022-11-09 VITALS
HEIGHT: 60 IN | SYSTOLIC BLOOD PRESSURE: 110 MMHG | DIASTOLIC BLOOD PRESSURE: 70 MMHG | WEIGHT: 143 LBS | BODY MASS INDEX: 28.07 KG/M2

## 2022-11-09 DIAGNOSIS — L50.1 IDIOPATHIC URTICARIA: ICD-10-CM

## 2022-11-09 PROCEDURE — 99203 OFFICE O/P NEW LOW 30 MIN: CPT

## 2022-11-09 NOTE — SOCIAL HISTORY
[Apartment] : [unfilled] lives in an apartment  [Radiator/Baseboard] : heating provided by radiator(s)/baseboard(s) [Window Units] : air conditioning provided by window units [Dry] : dry [None] : none [] :  [Humidifier] : does not use a humidifier [Dehumidifier] : does not use a dehumidifier [Cockroaches] : Patient states that there are no cockroaches in the home [Dust Mite Covers] : does not have dust mite covers [Feather Pillows] : does not have feather pillows [Feather Comforter] : does not have a feather comforter [Bedroom] : not in the bedroom [Basement] : not in the basement [Living Area] : not in the living area [Smokers in Household] : there are no smokers in the home

## 2022-11-09 NOTE — HISTORY OF PRESENT ILLNESS
[None] : The patient is currently asymptomatic [de-identified] : MARLON SANDOVAL is a 47 year  old female with a 2 year history of itching of the skin on and off with red blotches. The patient is convinced it comes from a bug bite. In the last two months the itching with red blotches after she scratches is almost daily lasting a few hours to a day or two. Usually it subsides on it's own or sometimes she takes Benadryl. She saw her primary care physician for this condition followed by two dermatologists. She was told that she has urticaria and dandruff of the scalp. She was prescribed various lotions and shampoo for the scalp as well as Cetirizine 10 mg and Hydroxyzine 10 mg tablets. She was also that this is stress related. Patient states she did not fill the prescriptions because she thought this is due to food allergy or bug bites. A detailed history of food intake and development of rash did not reveal any correlation.

## 2022-11-09 NOTE — ASSESSMENT
[FreeTextEntry1] : The patient admits that she is under a lot of stress because she has a 25 year old with schizophrenia and there have been a lot of tragedies in her lief as of recently. In my opinion her rash is stress induced urticaria. I had long conversation with her in which I explained the mechanism and I encouraged her to take the medications prescribed by the dermatologist and that is unlikely food allergy. The patient agreed that she will do that and followed up with dermatologist.

## 2022-11-09 NOTE — PHYSICAL EXAM

## 2023-01-03 ENCOUNTER — OUTPATIENT (OUTPATIENT)
Dept: OUTPATIENT SERVICES | Facility: HOSPITAL | Age: 48
LOS: 1 days | Discharge: HOME | End: 2023-01-03

## 2023-01-03 DIAGNOSIS — Z98.890 OTHER SPECIFIED POSTPROCEDURAL STATES: Chronic | ICD-10-CM

## 2023-01-04 NOTE — ED ADULT TRIAGE NOTE - HEIGHT IN INCHES
REFERRING PHYSICIAN: Michelle Joya MD            Dear Michelle Joya MD:      As you know, I had the opportunity to meet your patient, Lori Patel, for purposes of genetic counselling regarding her family history of breast cancer.  Subsequently, Ms. Patel elected to pursue CancerNext-Expanded® +RNAinsight® ([employer]).  The results of those studies indicate that a variant of uncertain significance (p.V627I) was detected in the BRCA1 gene. Additionally, a variant of uncertain significance (p.Y521D) was detected in the MSH2 gene. Overall, we would consider the results clinically negative at this time.     A variant of unknown significance is a change in a gene that has not been shown definitively to have the potential to cause harm. In the majority cases of variants of unknown significance, the genetic change identified will eventually be shown to be of no consequence (benign).  In the remaining cases, the variant will eventually be shown to be considered to represent a deleterious mutation, one capable of increasing an individual’s risk for cancer.      As I discussed with Ms. Patel, [facility], the laboratory that performed the testing, maintains variants in a database.   Should the change in the gene be characterized, they will contact us and inform us as to whether or not the variant identified is either benign or of consequence.  If the variant is considered to be benign, any individual carrying the variant would not be considered to be at increased risk for developing cancer on this basis.  Should the variant, however, be determined to be deleterious, the individual carrying it would be considered to have a significantly increased risk to develop cancer.     Variants of uncertain significance should NOT be used to alter medical management or test family members. In spite of the absence of an identifiable mutation, it would still be recommended that women in Ms. Patel's family have regular mammography  beginning at age 34 with a baseline mammogram 5 years earlier.  Breast MRI can also be considered as an additional screening measure. Ms. Patel's lifetime risk of breast cancer (see below) meets NCCN guidelines for breast MRI surveillance.     No further genetic testing for cancer would be indicated for Ms. Patel at this time.  Because risk assessments are primarily based on family history, I would encourage Ms. Patel to contact me if any changes occur in her family history.    I have had the opportunity to discuss these results with Ms. Patel.  Should you or she have any questions or concerns regarding this information, please do not hesitate to call.  Thank you for allowing me to participate in the care of your patient.       Coleen Khalil, [facility]  Licensed Certified Genetic Counselor          0

## 2023-04-25 NOTE — ED ADULT TRIAGE NOTE - BP NONINVASIVE DIASTOLIC (MM HG)
Healthy 18 y.o. male child.  1. Anticipatory guidance discussed.  2. Diagnoses and all orders for this visit:  Encounter for routine child health examination without abnormal findings  Pediatric body mass index (BMI) of 5th percentile to less than 85th percentile for age  Other migraine without status migrainosus, not intractable  -     SUMAtriptan (Imitrex) 25 mg tablet; Take 1 tablet (25 mg) by mouth 1 time if needed for migraine for up to 1 dose. Take by mouth.  May repeat dose in 2 hours if needed.  Other orders  -     1 Year Follow Up In Pediatrics; Future  -     Meningococcal ACWY vaccine, 2-vial component (MENVEO)      3. Follow-up visit in 1 year for next well child visit, or sooner as needed.    
57

## 2023-07-10 ENCOUNTER — EMERGENCY (EMERGENCY)
Facility: HOSPITAL | Age: 48
LOS: 0 days | Discharge: HOME | End: 2023-07-10
Attending: STUDENT IN AN ORGANIZED HEALTH CARE EDUCATION/TRAINING PROGRAM
Payer: SELF-PAY

## 2023-07-10 VITALS
OXYGEN SATURATION: 97 % | TEMPERATURE: 98 F | DIASTOLIC BLOOD PRESSURE: 75 MMHG | RESPIRATION RATE: 16 BRPM | SYSTOLIC BLOOD PRESSURE: 140 MMHG | HEART RATE: 85 BPM | WEIGHT: 145.06 LBS

## 2023-07-10 DIAGNOSIS — M79.89 OTHER SPECIFIED SOFT TISSUE DISORDERS: ICD-10-CM

## 2023-07-10 DIAGNOSIS — Z88.0 ALLERGY STATUS TO PENICILLIN: ICD-10-CM

## 2023-07-10 DIAGNOSIS — L72.0 EPIDERMAL CYST: ICD-10-CM

## 2023-07-10 DIAGNOSIS — Z98.890 OTHER SPECIFIED POSTPROCEDURAL STATES: Chronic | ICD-10-CM

## 2023-07-10 DIAGNOSIS — H57.11 OCULAR PAIN, RIGHT EYE: ICD-10-CM

## 2023-07-10 PROCEDURE — 99284 EMERGENCY DEPT VISIT MOD MDM: CPT

## 2023-07-10 PROCEDURE — 76882 US LMTD JT/FCL EVL NVASC XTR: CPT | Mod: LT

## 2023-07-10 PROCEDURE — 99284 EMERGENCY DEPT VISIT MOD MDM: CPT | Mod: 25

## 2023-07-10 PROCEDURE — 76882 US LMTD JT/FCL EVL NVASC XTR: CPT | Mod: 26,LT

## 2023-07-10 NOTE — ED PROVIDER NOTE - PATIENT PORTAL LINK FT
You can access the FollowMyHealth Patient Portal offered by [facility] by registering at the following website: [email or web address]. By joining [facility]’s FollowMyHealth portal, you will also be able to view your health information using other applications (apps) compatible with our system.

## 2023-07-10 NOTE — ED PROVIDER NOTE - CARE PROVIDER_API CALL
Marcelino Mart  Internal Medicine  [address] [place] [address]  Phone: [phone]  Fax: [phone]  Follow Up Time: 1-3 Days

## 2023-07-10 NOTE — ED PROVIDER NOTE - CLINICAL SUMMARY MEDICAL DECISION MAKING FREE TEXT BOX
47-year-old female no past medical history presents here for 1 week of soreness above her left eyebrow no double vision no fevers or chills no numbness does endorse some throbbing to right upper extremity but attributes that to carrying her daughter noticed small swelling to left forearm few days ago wanted evaluation vs reviewed. pocus performed no signs of cellulitis . Patient spoken to in detail reguarding follow up . Has a PMD Dr. Azul recommended follow up

## 2023-07-10 NOTE — ED PROVIDER NOTE - OBJECTIVE STATEMENT
47-year-old female no past medical history presents here for 1 week of soreness above her left eyebrow no double vision no fevers or chills no numbness does endorse some throbbing to right upper extremity but attributes that to carrying her daughter noticed small swelling to left forearm few days ago wanted evaluation

## 2023-07-10 NOTE — ED PROVIDER NOTE - PHYSICAL EXAMINATION
CONSTITUTIONAL: WA / WN / NAD  HEAD: NCAT + ttp above left eyebrow no streaking or induration  EYES: PERRL; EOMI;   NECK: Supple;   MSK/EXT: No gross deformities +small mobile 0.5cm x 0.5cm area to left forearm non erythematous non streaking   SKIN: Warm and dry;   NEURO: AAOx3  PSYCH: Memory Intact, Normal Affect

## 2023-07-21 NOTE — OB RN TRIAGE NOTE - ABORTIONS, OB PROFILE
[facility]    Hematology/Oncology Return patient visit  video    Today's Date: July 19, 2023     Reason for Consult: Breast cancer     HISTORY OF PRESENT ILLNESS: Jazmin is a very pleasant 66-year-old patient of mine whom I been seeing for years.  She is transferring care from [place] [facility]      1.  Diagnosed with breast cancer T2 invasive lobular carcinoma and invasive ductal carcinoma in May 2016  2.  Oncotype DX low risk of recurrence  3. Mastectomies final tumor was ER positive MT positive HER-2/philippe negative with evidence of lymphovascular invasion 1 out of 2 sentinel lymph nodes were positive for carcinoma measuring 3 mm in size  4.  Total tumor size 4 cm, Oncotype DX score was 13  5.  BRCA testing negative  6.  Letrozole June 2016 to November 2020 changed to Aromasin due to arthralgias  7.  Bone density scan recently shows a T score of -1.2 baseline initially prior to starting was normal  8 Started Aromasin and tolerating it much better    Interval history Jazmin presents for follow-up today.  Overall she is doing well.  She tried to get off the Lexapro but then had more issues with depression therefore she went back on it.  She is doing well on it.    REVIEW OF SYSTEMS:   14 point ROS was reviewed and is negative other than as noted above in HPI.       HOME MEDICATIONS:  Current Outpatient Medications   Medication Sig Dispense Refill     escitalopram (LEXAPRO) 10 MG tablet Take 1 tablet (10 mg) by mouth daily 90 tablet 3     exemestane (AROMASIN) 25 MG tablet TAKE 1 TABLET BY MOUTH EVERY DAY 90 tablet 3     ibuprofen (ADVIL,MOTRIN) 600 MG tablet Take 1 tablet (600 mg) by mouth every 6 hours as needed for pain (mild) 30 tablet 0     morphine (MS CONTIN) 15 MG CR tablet Take 15 mg by mouth       oxyCODONE HCl (OXECTA) 5 MG TABA Take 5 mg by mouth as needed       potassium chloride ER (KLOR-CON M) 20 MEQ CR tablet Take 1 tablet (20 mEq) by mouth in the morning and 1 tablet (20 mEq)  in the evening. 180 tablet 1     rizatriptan (MAXALT) 10 MG tablet Take 10 mg by mouth at onset of headache May repeat in 2 hours if needed: max 2/day; average number of headaches monthly 1       triamterene-hydrochlorothiazide (MAXZIDE-25) 37.5-25 MG per tablet Take 1 tablet by mouth daily.       VITAMIN D, CHOLECALCIFEROL, PO Take  by mouth.       YUVAFEM 10 MCG TABS vaginal tablet  (Patient not taking: Reported on 7/19/2023)           ALLERGIES:  Allergies   Allergen Reactions     Influenza Virus Vaccine Other (See Comments)     Guillain Montgomery syndrome         PAST MEDICAL HISTORY:  Past Medical History:   Diagnosis Date     Anxiety      Breast cancer (H) 3/30/2016    Left breast     H/O Guillain-Montgomery syndrome      Hypertension      Migraines      Peripheral neuropathy     Related to h/o Guillain-Montgomery syndrome     S/P radiation therapy     6,040 cGy to left chest wall_axilla and 4,500 cGy to left SCV_axilla completed on 8/12/2016 - [facility]         PAST SURGICAL HISTORY:  Past Surgical History:   Procedure Laterality Date     BILATERAL 1ST STAGE BREAST RECONSTRUCTION Bilateral 5/3/2016    Dr. Marry Aldridge     BIOPSY BREAST SEED LOCALIZATION Left 3/30/2016    Procedure: BIOPSY BREAST SEED LOCALIZATION;  Surgeon: Yue Smiley MD;  Location: [facility]     BREAST BIOPSY, CORE RT/LT Left 9/2/2015     BREAST BIOPSY, CORE RT/LT Left 3/23/2016     CHOLECYSTECTOMY  1998     MAMMOPLASTY REDUCTION Bilateral 1989     MASTECTOMY SIMPLE BILATERAL Bilateral 5/3/2016     [facility]     OTHER - AXILLARY SENTINEL LYMPH NODE BIOPSY Left 5/3/2016     [facility]     TUBAL LIGATION  1998         SOCIAL HISTORY:  Social History     Socioeconomic History     Marital status:      Spouse name: Not on file     Number of children: Not on file     Years of education: Not on file     Highest education level: Not on file   Occupational History     Not on file   Tobacco  Use     Smoking status: Never     Smokeless tobacco: Never   Substance and Sexual Activity     Alcohol use: No     Alcohol/week: 0.0 standard drinks of alcohol     Drug use: No     Sexual activity: Yes     Partners: Male     Birth control/protection: Female Surgical     Comment: Tubal ligation    Other Topics Concern     Parent/sibling w/ CABG, MI or angioplasty before 65F 55M? Not Asked   Social History Narrative     Not on file     Social Determinants of Health     Financial Resource Strain: Not on file   Food Insecurity: Not on file   Transportation Needs: Not on file   Physical Activity: Not on file   Stress: Not on file   Social Connections: Not on file   Intimate Partner Violence: Not on file   Housing Stability: Not on file         FAMILY HISTORY:  Family History   Problem Relation Age of Onset     Cancer Mother 65        Breast, living     Cancer Father 55        Melanoma, living     Cardiovascular Maternal Grandmother         Heart attack     Cancer Maternal Grandfather 71        Gallbladder,      Unknown/Adopted Paternal Grandmother         old age      Cardiovascular Paternal Grandfather         heart attack     Cancer Brother 40        Testicular, living     Cancer Other         Breast,      Cancer Maternal Aunt 55        Breast, living (mother's twin sister)     Cancer Maternal Aunt 60        Breast, living     Cancer Other 55        Breast, living     Cancer Other         Breast,      Cancer Other         Breast,  (Daughter to above maternal great aunt)     Cancer Other         Breast,  (Daughter to above maternal great aunt)         PHYSICAL EXAM:  Vital signs:  /78   Pulse 66   Resp 16   LMP 2009   SpO2 98%    ECO  GENERAL/CONSTITUTIONAL: No acute distress.  Heart regular rate and rhythm  Lungs clear       Status post bilateral mastectomies with reconstruction no evidence of recurrence      LABS:  Labs noted and reviewed all stable  except for CA 15-3 which came back at 27      PATHOLOGY:    As per HPI    IMAGING:  None    ASSESSMENT/PLAN:  Jazmin is a very pleasant 66-year-old female who has a history of invasive lobular carcinoma stage IIb ER/MA positive HER-2/philippe negative currently on Aromasin    I discussed with the patient that in her situation being that she is lymph node positive and a lobular cancer with lymphovascular invasion my recommendation would be to continue 10 years of therapy therefore she will complete in 2026.        Continue potassium         1.  Breast cancer: continue aromasin, labs stable  2 anxiety/depression  Start lexapro 10 mg a day  3.  Dental work: Continue as per her dentist.       4.  Bone health: Calcium 1200 mg a day,    Vitamin D 2000 IUs daily plus Osteo Bi-Flex.  DEXA is normal next one due in 2025    5.  Guillain-Barré syndrome: Chronic foot pain continue management per primary care physician.       Parker Richardson MD  Hematology/Oncology  [facility]   2

## 2024-01-24 NOTE — ED ADULT NURSE NOTE - CHIEF COMPLAINT QUOTE
Called [facility] and was transferred to the nurses station.   No answer and message states to not leave a voicemail and try calling back.   #[phone]   pt states her finger is infected, had stitches to applied two weeks ago

## 2024-04-01 ENCOUNTER — OUTPATIENT (OUTPATIENT)
Dept: OUTPATIENT SERVICES | Facility: HOSPITAL | Age: 49
LOS: 1 days | End: 2024-04-01
Payer: MEDICAID

## 2024-04-01 ENCOUNTER — APPOINTMENT (OUTPATIENT)
Age: 49
End: 2024-04-01
Payer: MEDICAID

## 2024-04-01 DIAGNOSIS — N64.4 MASTODYNIA: ICD-10-CM

## 2024-04-01 DIAGNOSIS — Z98.890 OTHER SPECIFIED POSTPROCEDURAL STATES: Chronic | ICD-10-CM

## 2024-04-01 DIAGNOSIS — Z12.31 ENCOUNTER FOR SCREENING MAMMOGRAM FOR MALIGNANT NEOPLASM OF BREAST: ICD-10-CM

## 2024-04-01 PROCEDURE — 93971 EXTREMITY STUDY: CPT | Mod: RT

## 2024-04-01 PROCEDURE — 93971 EXTREMITY STUDY: CPT | Mod: 26,RT

## 2024-04-01 PROCEDURE — 76641 ULTRASOUND BREAST COMPLETE: CPT | Mod: 26,RT

## 2024-04-01 PROCEDURE — G0279: CPT

## 2024-04-01 PROCEDURE — G0279: CPT | Mod: 26

## 2024-04-01 PROCEDURE — 77066 DX MAMMO INCL CAD BI: CPT

## 2024-04-01 PROCEDURE — 76641 ULTRASOUND BREAST COMPLETE: CPT | Mod: RT

## 2024-04-01 PROCEDURE — 77066 DX MAMMO INCL CAD BI: CPT | Mod: 26

## 2024-04-02 DIAGNOSIS — R22.9 LOCALIZED SWELLING, MASS AND LUMP, UNSPECIFIED: ICD-10-CM

## 2024-04-02 DIAGNOSIS — N64.4 MASTODYNIA: ICD-10-CM

## 2024-07-01 NOTE — ED ADULT NURSE NOTE - DISCHARGE DATE/TIME
"Process Group Note    PATIENT'S NAME: Randi Cleary  MRN:   [id number]  :   1951  ACCT. NUMBER: [id number]  DATE OF SERVICE: 24  START TIME:  1:00 PM  END TIME:  1:50 PM  FACILITATOR: Luh Hare LICSW  TOPIC:  Process Group    Diagnoses:  296.33 (F33.2) Major Depressive Disorder, Recurrent Episode, Severe With anxious distress  300.02 (F41.1) Generalized Anxiety Disorder    Rule out:  296.89 Bipolar II Disorder vs. 314.01 (F90.2) Attention-Deficit/Hyperactivity Disorder   Combined presentation    [facility] Outpatient [facility]  TRACK: IOP/DT 3 55+    NUMBER OF PARTICIPANTS: 5        Data:    Session content: At the start of this group, patients were invited to check in by identifying themselves, describing their current emotional status, and identifying issues to address in this group.   Area(s) of treatment focus addressed in this session included Symptom Management, Personal Safety, and Community Resources/Discharge Planning.  Winnie attended her first day of IOP/DT 3 55+ today.  Writer oriented her to group and outlined group expectations.  She shared events leading to her admission, including losing her therapist, a round of TMS with high efficacy, though followed shortly by symptom relapse, a 1 month inpatient stay and a course of maintenance ECT.  She reports being disabled since  and lives with her  and cat.  She notes chronic pain, including migraines as a trigger of SI (\"life isn't worth living anymore\").  Reported mood is depressed.   Client denied safety concerns, including SI and SIB. Client reports using medical marijuana and tries to limit it to \"one hit per night\".  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  Client is taking medications as prescribed. She is hopeful about starting IOP today. Client's goal is \"continue the positivity, be kinder to myself and others\".  She notices a tendency to compare her life situation to others, causing " "intense anger. Client is proud of \"being a survivor\". Peers offered supportive feedback and validation.    Therapeutic Interventions/Treatment Strategies:  Psychotherapist offered support, feedback and validation and reinforced use of skills. Treatment modalities used include Cognitive Behavioral Therapy and Dialectical Behavioral Therapy. Interventions include Cognitive Restructuring:  Explored impact of ineffective thoughts / distortions on mood and activity and Assisted patient in formulating new neutral/positive alternatives to challenge less helpful / ineffective thoughts and Emotions Management:  Discussed barriers to emotional regulation.    Assessment:    Patient response:   Patient responded to session by accepting feedback, giving feedback, listening, focusing on goals, being attentive, and accepting support    Possible barriers to participation / learning include: and no barriers identified    Health Issues:   Yes: Pain, Associated Psychological Distress       Substance Use Review:   Substance Use: No active concerns identified., cannabis . , and Last use: last night    Mental Status/Behavioral Observations  Appearance:   Appropriate   Eye Contact:   Good   Psychomotor Behavior: Normal   Attitude:   Cooperative  Interested Friendly Pleasant  Orientation:   All  Speech   Rate / Production: Normal    Volume:  Normal   Mood:    Depressed   Affect:    Appropriate   Thought Content:   Clear and Safety reports  presence of suicidal ideation passive suicidal ideation   Thought Form:  Coherent  Logical     Insight:    Good     Plan:   Safety Plan: Committed to safety and agreed to follow previously developed safety coping plan.    Barriers to treatment: None identified  Patient Contracts (see media tab):  None  Substance Use: Not addressed in session   Continue or Discharge: Patient will continue in 55+ Program (55+) as planned. Patient is likely to benefit from learning and using skills as they work toward the " goals identified in their treatment plan.      Luh Hare, [facility]  July 1, 2024   25-Mar-2022 23:14

## 2024-09-26 ENCOUNTER — OUTPATIENT (OUTPATIENT)
Dept: OUTPATIENT SERVICES | Facility: HOSPITAL | Age: 49
LOS: 1 days | End: 2024-09-26
Payer: MEDICAID

## 2024-09-26 DIAGNOSIS — Z98.890 OTHER SPECIFIED POSTPROCEDURAL STATES: Chronic | ICD-10-CM

## 2024-09-26 DIAGNOSIS — N92.3 OVULATION BLEEDING: ICD-10-CM

## 2024-09-26 DIAGNOSIS — Z00.8 ENCOUNTER FOR OTHER GENERAL EXAMINATION: ICD-10-CM

## 2024-09-26 PROCEDURE — 76830 TRANSVAGINAL US NON-OB: CPT | Mod: 26

## 2024-09-26 PROCEDURE — 76856 US EXAM PELVIC COMPLETE: CPT

## 2024-09-26 PROCEDURE — 76700 US EXAM ABDOM COMPLETE: CPT | Mod: 26

## 2024-09-26 PROCEDURE — 76830 TRANSVAGINAL US NON-OB: CPT

## 2024-09-26 PROCEDURE — 76856 US EXAM PELVIC COMPLETE: CPT | Mod: 26

## 2024-09-26 PROCEDURE — 76700 US EXAM ABDOM COMPLETE: CPT

## 2024-09-27 DIAGNOSIS — N92.3 OVULATION BLEEDING: ICD-10-CM

## 2024-09-27 DIAGNOSIS — R10.9 UNSPECIFIED ABDOMINAL PAIN: ICD-10-CM

## 2024-11-26 ENCOUNTER — OUTPATIENT (OUTPATIENT)
Dept: OUTPATIENT SERVICES | Facility: HOSPITAL | Age: 49
LOS: 1 days | End: 2024-11-26
Payer: MEDICAID

## 2024-11-26 DIAGNOSIS — Z98.890 OTHER SPECIFIED POSTPROCEDURAL STATES: Chronic | ICD-10-CM

## 2024-11-26 DIAGNOSIS — R59.9 ENLARGED LYMPH NODES, UNSPECIFIED: ICD-10-CM

## 2024-11-26 DIAGNOSIS — Z00.8 ENCOUNTER FOR OTHER GENERAL EXAMINATION: ICD-10-CM

## 2024-11-26 PROCEDURE — 76856 US EXAM PELVIC COMPLETE: CPT | Mod: 26

## 2024-11-26 PROCEDURE — 76856 US EXAM PELVIC COMPLETE: CPT

## 2024-11-27 DIAGNOSIS — R59.9 ENLARGED LYMPH NODES, UNSPECIFIED: ICD-10-CM

## 2024-12-16 NOTE — OB RN PATIENT PROFILE - PRO INTERPRETER NEED 2
Patient notified, verbalized understanding of information given, and has no additional questions or concerns and this time.   English

## 2025-01-09 ENCOUNTER — OUTPATIENT (OUTPATIENT)
Dept: OUTPATIENT SERVICES | Facility: HOSPITAL | Age: 50
LOS: 1 days | End: 2025-01-09
Payer: MEDICAID

## 2025-01-09 DIAGNOSIS — Z12.31 ENCOUNTER FOR SCREENING MAMMOGRAM FOR MALIGNANT NEOPLASM OF BREAST: ICD-10-CM

## 2025-01-09 DIAGNOSIS — Z98.890 OTHER SPECIFIED POSTPROCEDURAL STATES: Chronic | ICD-10-CM

## 2025-01-09 DIAGNOSIS — R92.8 OTHER ABNORMAL AND INCONCLUSIVE FINDINGS ON DIAGNOSTIC IMAGING OF BREAST: ICD-10-CM

## 2025-01-09 PROCEDURE — G0279: CPT

## 2025-01-09 PROCEDURE — 77066 DX MAMMO INCL CAD BI: CPT | Mod: 26

## 2025-01-09 PROCEDURE — G0279: CPT | Mod: 26

## 2025-01-09 PROCEDURE — 77066 DX MAMMO INCL CAD BI: CPT

## 2025-01-09 PROCEDURE — 76641 ULTRASOUND BREAST COMPLETE: CPT | Mod: 50

## 2025-01-09 PROCEDURE — 76641 ULTRASOUND BREAST COMPLETE: CPT | Mod: 26,50

## 2025-01-10 DIAGNOSIS — R92.8 OTHER ABNORMAL AND INCONCLUSIVE FINDINGS ON DIAGNOSTIC IMAGING OF BREAST: ICD-10-CM

## 2025-01-23 ENCOUNTER — APPOINTMENT (OUTPATIENT)
Age: 50
End: 2025-01-23
Payer: MEDICAID

## 2025-01-23 ENCOUNTER — OUTPATIENT (OUTPATIENT)
Dept: OUTPATIENT SERVICES | Facility: HOSPITAL | Age: 50
LOS: 1 days | End: 2025-01-23
Payer: MEDICAID

## 2025-01-23 DIAGNOSIS — Z98.890 OTHER SPECIFIED POSTPROCEDURAL STATES: Chronic | ICD-10-CM

## 2025-01-23 DIAGNOSIS — R92.8 OTHER ABNORMAL AND INCONCLUSIVE FINDINGS ON DIAGNOSTIC IMAGING OF BREAST: ICD-10-CM

## 2025-01-23 PROCEDURE — 88305 TISSUE EXAM BY PATHOLOGIST: CPT | Mod: 26

## 2025-01-23 PROCEDURE — 77065 DX MAMMO INCL CAD UNI: CPT | Mod: LT

## 2025-01-23 PROCEDURE — 77065 DX MAMMO INCL CAD UNI: CPT | Mod: 26,LT

## 2025-01-23 PROCEDURE — 19081 BX BREAST 1ST LESION STRTCTC: CPT | Mod: LT

## 2025-01-23 PROCEDURE — A4648: CPT

## 2025-01-23 PROCEDURE — 88305 TISSUE EXAM BY PATHOLOGIST: CPT

## 2025-01-24 LAB — SURGICAL PATHOLOGY STUDY: SIGNIFICANT CHANGE UP

## 2025-01-27 DIAGNOSIS — R92.8 OTHER ABNORMAL AND INCONCLUSIVE FINDINGS ON DIAGNOSTIC IMAGING OF BREAST: ICD-10-CM

## 2025-02-23 ENCOUNTER — NON-APPOINTMENT (OUTPATIENT)
Age: 50
End: 2025-02-23

## 2025-03-17 ENCOUNTER — APPOINTMENT (OUTPATIENT)
Dept: BREAST CENTER | Facility: CLINIC | Age: 50
End: 2025-03-17

## 2025-03-20 NOTE — OB RN INTRAOPERATIVE NOTE - NS_COUNTCORRECT_OBGYN_ALL_OB

## 2025-04-21 NOTE — DISCHARGE NOTE PROVIDER - NSDCACTIVITY_GEN_ALL_CORE
Large Joint Aspiration/Injection: L knee    Date/Time: 4/21/2025 8:00 AM    Performed by: Len Tolentino MD  Authorized by: Len Tolentino MD    Consent Done?:  Yes (Verbal)  Timeout: prior to procedure the correct patient, procedure, and site was verified    Prep: patient was prepped and draped in usual sterile fashion      Details:  Needle Size:  21 G  Approach:  Anterolateral  Location:  Knee  Site:  L knee  Medications:  40 mg triamcinolone acetonide 40 mg/mL  Patient tolerance:  Patient tolerated the procedure well with no immediate complications    
No restrictions

## 2025-05-20 ENCOUNTER — APPOINTMENT (OUTPATIENT)
Dept: GASTROENTEROLOGY | Facility: CLINIC | Age: 50
End: 2025-05-20